# Patient Record
Sex: FEMALE | Race: WHITE | Employment: FULL TIME | URBAN - METROPOLITAN AREA
[De-identification: names, ages, dates, MRNs, and addresses within clinical notes are randomized per-mention and may not be internally consistent; named-entity substitution may affect disease eponyms.]

---

## 2021-10-28 ENCOUNTER — HOSPITAL ENCOUNTER (OUTPATIENT)
Dept: GENERAL RADIOLOGY | Age: 71
Discharge: HOME | End: 2021-10-28
Payer: MEDICARE

## 2021-10-28 DIAGNOSIS — M54.42 CHRONIC LEFT-SIDED LOW BACK PAIN WITH LEFT-SIDED SCIATICA: ICD-10-CM

## 2021-10-28 DIAGNOSIS — G89.29 CHRONIC LEFT-SIDED LOW BACK PAIN WITH LEFT-SIDED SCIATICA: ICD-10-CM

## 2021-10-28 PROCEDURE — 72100 X-RAY EXAM L-S SPINE 2/3 VWS: CPT

## 2021-11-24 ENCOUNTER — HOSPITAL ENCOUNTER (OUTPATIENT)
Dept: WOMENS IMAGING | Age: 71
Discharge: HOME | End: 2021-11-24
Payer: MEDICARE

## 2021-11-24 VITALS — HEIGHT: 64 IN | BODY MASS INDEX: 26.46 KG/M2 | WEIGHT: 155 LBS

## 2021-11-24 DIAGNOSIS — Z12.31 VISIT FOR SCREENING MAMMOGRAM: ICD-10-CM

## 2021-11-24 PROCEDURE — 77063 BREAST TOMOSYNTHESIS BI: CPT

## 2022-02-06 ENCOUNTER — HOSPITAL ENCOUNTER (EMERGENCY)
Age: 72
Discharge: HOME | End: 2022-02-06
Attending: EMERGENCY MEDICINE
Payer: MEDICARE

## 2022-02-06 VITALS
BODY MASS INDEX: 26.46 KG/M2 | RESPIRATION RATE: 19 BRPM | HEIGHT: 64 IN | HEART RATE: 74 BPM | WEIGHT: 155 LBS | OXYGEN SATURATION: 100 % | SYSTOLIC BLOOD PRESSURE: 167 MMHG | TEMPERATURE: 98 F | DIASTOLIC BLOOD PRESSURE: 77 MMHG

## 2022-02-06 DIAGNOSIS — R11.0 NAUSEA: ICD-10-CM

## 2022-02-06 DIAGNOSIS — R73.9 HYPERGLYCEMIA: Primary | ICD-10-CM

## 2022-02-06 LAB
A/G RATIO: 1.9 (ref 1.1–2.2)
ALBUMIN SERPL-MCNC: 4.3 G/DL (ref 3.4–5)
ALP BLD-CCNC: 105 U/L (ref 40–129)
ALT SERPL-CCNC: 14 U/L (ref 10–40)
ANION GAP SERPL CALCULATED.3IONS-SCNC: 13 MMOL/L (ref 3–16)
AST SERPL-CCNC: 13 U/L (ref 15–37)
BASE EXCESS VENOUS: 1.9 MMOL/L (ref -3–3)
BASOPHILS ABSOLUTE: 0 K/UL (ref 0–0.2)
BASOPHILS RELATIVE PERCENT: 0.6 %
BETA-HYDROXYBUTYRATE: 0.1 MMOL/L (ref 0–0.27)
BILIRUB SERPL-MCNC: 0.4 MG/DL (ref 0–1)
BUN BLDV-MCNC: 19 MG/DL (ref 7–20)
CALCIUM SERPL-MCNC: 9.6 MG/DL (ref 8.3–10.6)
CARBOXYHEMOGLOBIN: 0.9 % (ref 0–1.5)
CHLORIDE BLD-SCNC: 101 MMOL/L (ref 99–110)
CO2: 26 MMOL/L (ref 21–32)
CREAT SERPL-MCNC: 0.7 MG/DL (ref 0.6–1.2)
EKG ATRIAL RATE: 74 BPM
EKG DIAGNOSIS: NORMAL
EKG P AXIS: 70 DEGREES
EKG P-R INTERVAL: 122 MS
EKG Q-T INTERVAL: 394 MS
EKG QRS DURATION: 80 MS
EKG QTC CALCULATION (BAZETT): 437 MS
EKG R AXIS: 40 DEGREES
EKG T AXIS: 58 DEGREES
EKG VENTRICULAR RATE: 74 BPM
EOSINOPHILS ABSOLUTE: 0.1 K/UL (ref 0–0.6)
EOSINOPHILS RELATIVE PERCENT: 1.4 %
GFR AFRICAN AMERICAN: >60
GFR NON-AFRICAN AMERICAN: >60
GLUCOSE BLD-MCNC: 177 MG/DL (ref 70–99)
GLUCOSE BLD-MCNC: 194 MG/DL (ref 70–99)
HCO3 VENOUS: 27.4 MMOL/L (ref 23–29)
HCT VFR BLD CALC: 43.1 % (ref 36–48)
HEMOGLOBIN: 14.2 G/DL (ref 12–16)
LYMPHOCYTES ABSOLUTE: 1.2 K/UL (ref 1–5.1)
LYMPHOCYTES RELATIVE PERCENT: 14.7 %
MCH RBC QN AUTO: 29.9 PG (ref 26–34)
MCHC RBC AUTO-ENTMCNC: 33 G/DL (ref 31–36)
MCV RBC AUTO: 90.6 FL (ref 80–100)
METHEMOGLOBIN VENOUS: 0.4 %
MONOCYTES ABSOLUTE: 0.7 K/UL (ref 0–1.3)
MONOCYTES RELATIVE PERCENT: 8.2 %
NEUTROPHILS ABSOLUTE: 6.4 K/UL (ref 1.7–7.7)
NEUTROPHILS RELATIVE PERCENT: 75.1 %
O2 SAT, VEN: 68 %
O2 THERAPY: NORMAL
PCO2, VEN: 46 MMHG (ref 40–50)
PDW BLD-RTO: 13.3 % (ref 12.4–15.4)
PERFORMED ON: ABNORMAL
PH VENOUS: 7.39 (ref 7.35–7.45)
PLATELET # BLD: 255 K/UL (ref 135–450)
PMV BLD AUTO: 8.2 FL (ref 5–10.5)
PO2, VEN: 35.2 MMHG (ref 25–40)
POTASSIUM REFLEX MAGNESIUM: 4.3 MMOL/L (ref 3.5–5.1)
RBC # BLD: 4.76 M/UL (ref 4–5.2)
SODIUM BLD-SCNC: 140 MMOL/L (ref 136–145)
TCO2 CALC VENOUS: 29 MMOL/L
TOTAL PROTEIN: 6.6 G/DL (ref 6.4–8.2)
TROPONIN: <0.01 NG/ML
WBC # BLD: 8.5 K/UL (ref 4–11)

## 2022-02-06 PROCEDURE — 93005 ELECTROCARDIOGRAM TRACING: CPT | Performed by: EMERGENCY MEDICINE

## 2022-02-06 PROCEDURE — 85025 COMPLETE CBC W/AUTO DIFF WBC: CPT

## 2022-02-06 PROCEDURE — 96374 THER/PROPH/DIAG INJ IV PUSH: CPT

## 2022-02-06 PROCEDURE — 99283 EMERGENCY DEPT VISIT LOW MDM: CPT

## 2022-02-06 PROCEDURE — 2580000003 HC RX 258: Performed by: EMERGENCY MEDICINE

## 2022-02-06 PROCEDURE — 84484 ASSAY OF TROPONIN QUANT: CPT

## 2022-02-06 PROCEDURE — 93010 ELECTROCARDIOGRAM REPORT: CPT | Performed by: INTERNAL MEDICINE

## 2022-02-06 PROCEDURE — 82010 KETONE BODYS QUAN: CPT

## 2022-02-06 PROCEDURE — 6360000002 HC RX W HCPCS: Performed by: EMERGENCY MEDICINE

## 2022-02-06 PROCEDURE — 82803 BLOOD GASES ANY COMBINATION: CPT

## 2022-02-06 PROCEDURE — 80053 COMPREHEN METABOLIC PANEL: CPT

## 2022-02-06 PROCEDURE — 96361 HYDRATE IV INFUSION ADD-ON: CPT

## 2022-02-06 RX ORDER — 0.9 % SODIUM CHLORIDE 0.9 %
1000 INTRAVENOUS SOLUTION INTRAVENOUS ONCE
Status: COMPLETED | OUTPATIENT
Start: 2022-02-06 | End: 2022-02-06

## 2022-02-06 RX ORDER — ONDANSETRON 2 MG/ML
4 INJECTION INTRAMUSCULAR; INTRAVENOUS ONCE
Status: COMPLETED | OUTPATIENT
Start: 2022-02-06 | End: 2022-02-06

## 2022-02-06 RX ORDER — ONDANSETRON 4 MG/1
4 TABLET, ORALLY DISINTEGRATING ORAL EVERY 8 HOURS PRN
Qty: 14 TABLET | Refills: 0 | Status: SHIPPED | OUTPATIENT
Start: 2022-02-06 | End: 2022-09-29 | Stop reason: ALTCHOICE

## 2022-02-06 RX ADMIN — ONDANSETRON 4 MG: 2 INJECTION INTRAMUSCULAR; INTRAVENOUS at 00:50

## 2022-02-06 RX ADMIN — SODIUM CHLORIDE 1000 ML: 9 INJECTION, SOLUTION INTRAVENOUS at 00:51

## 2022-02-06 NOTE — ED PROVIDER NOTES
[address] [facility]  ED PROVIDER NOTE    Patient Identification  Pt Name: Karlene Vivar  MRN: [id number]  [place] 1950  Date of evaluation: 2/6/2022  Provider: Catherine Polanco MD  PCP: Arun Mahajan MD    Chief Complaint  Other (Hypoglycemia)      HPI  History provided by patient   This is a 73 y.o. female who presents to the ED for chief complaint of labile blood sugars. States that her blood sugar went from 200-100 very quickly. She has been feeling nauseated over the past 24 hours. She thinks that this may be due to a steroid injection she received for her chronic lower back pain however the steroid injection occurred on the 18th. Denies any pain. No chest pain, shortness of breath, abdominal pain, headache. No diarrhea. Ambulating without issue. No recent changes in her medications. ROS  12 systems reviewed, pertinent positives/negatives per HPI otherwise noted to be negative. I have reviewed the following nursing documentation:  Allergies: Patient has no known allergies. Past medical history:   Past Medical History:   Diagnosis Date    Bronchitis     Diabetes mellitus ([address]     Hypertension      Past surgical history:   Past Surgical History:   Procedure Laterality Date    HYSTERECTOMY      KIDNEY STONE SURGERY         Home medications:   Discharge Medication List as of 2/6/2022  2:19 AM      CONTINUE these medications which have NOT CHANGED    Details   sitagliptan (JANUVIA) 50 MG tablet Take  by mouth daily. Indications: unsure of dosageHistorical Med      atorvastatin (LIPITOR) 10 MG tablet Take  by mouth daily. Indications: unsure of dosageHistorical Med      lisinopril (PRINIVIL;ZESTRIL) 10 MG tablet Take  by mouth daily. Indications: unsure of dosageHistorical Med             Social history:  reports that she has never smoked. She has never used smokeless tobacco. She reports that she does not drink alcohol and does not use drugs.     Family history:  History reviewed. No pertinent family history. Exam  ED Triage Vitals [02/06/22 0014]   BP Temp Temp Source Pulse Resp SpO2 Height Weight   (!) 167/77 98 °F (36.7 °C) Oral -- 19 100 % 5' 4\" (1.626 m) 155 lb (70.3 kg)     Nursing note and vitals reviewed. Constitutional: In no acute distress  HENT:      Head: Normocephalic      Ears: External ears normal.      Nose: Nose normal.     Mouth: Membrane mucosa dry  Eyes: No discharge. Neck: Supple. Trachea midline. Cardiovascular: Regular rate. Warm extremities  Pulmonary/Chest: Effort normal. No respiratory distress. Abdominal: Soft. No distension. Nontender  : Deferred  Rectal: Deferred   Musculoskeletal: Moves all extremities. No gross deformity. Neurological: Alert and oriented. Face symmetric. Speech is clear. Skin: Warm and dry. Psychiatric: Normal mood and affect. Behavior is normal.    Procedures      EKG  The Ekg interpreted by me in the absence of a cardiologist shows. Normal sinus rhythm. No specific ST changes appreciated.   HR 74  No prior EKG for comparison      Radiology  No orders to display       Labs  Results for orders placed or performed during the hospital encounter of 02/06/22   CBC Auto Differential   Result Value Ref Range    WBC 8.5 4.0 - 11.0 K/uL    RBC 4.76 4.00 - 5.20 M/uL    Hemoglobin 14.2 12.0 - 16.0 g/dL    Hematocrit 43.1 36.0 - 48.0 %    MCV 90.6 80.0 - 100.0 fL    MCH 29.9 26.0 - 34.0 pg    MCHC 33.0 31.0 - 36.0 g/dL    RDW 13.3 12.4 - 15.4 %    Platelets 573 222 - 377 K/uL    MPV 8.2 5.0 - 10.5 fL    Neutrophils % 75.1 %    Lymphocytes % 14.7 %    Monocytes % 8.2 %    Eosinophils % 1.4 %    Basophils % 0.6 %    Neutrophils Absolute 6.4 1.7 - 7.7 K/uL    Lymphocytes Absolute 1.2 1.0 - 5.1 K/uL    Monocytes Absolute 0.7 0.0 - 1.3 K/uL    Eosinophils Absolute 0.1 0.0 - 0.6 K/uL    Basophils Absolute 0.0 0.0 - 0.2 K/uL   Comprehensive Metabolic Panel w/ Reflex to MG   Result Value Ref Range    Sodium 140 136 - 145 mmol/L Potassium reflex Magnesium 4.3 3.5 - 5.1 mmol/L    Chloride 101 99 - 110 mmol/L    CO2 26 21 - 32 mmol/L    Anion Gap 13 3 - 16    Glucose 194 (H) 70 - 99 mg/dL    BUN 19 7 - 20 mg/dL    CREATININE 0.7 0.6 - 1.2 mg/dL    GFR Non-African American >60 >60    GFR African American >60 >60    Calcium 9.6 8.3 - 10.6 mg/dL    Total Protein 6.6 6.4 - 8.2 g/dL    Albumin 4.3 3.4 - 5.0 g/dL    Albumin/Globulin Ratio 1.9 1.1 - 2.2    Total Bilirubin 0.4 0.0 - 1.0 mg/dL    Alkaline Phosphatase 105 40 - 129 U/L    ALT 14 10 - 40 U/L    AST 13 (L) 15 - 37 U/L   Blood Gas, Venous   Result Value Ref Range    pH, Joseph 7.393 7.350 - 7.450    pCO2, Joseph 46.0 40.0 - 50.0 mmHg    pO2, Joseph 35.2 25.0 - 40.0 mmHg    HCO3, Venous 27.4 23.0 - 29.0 mmol/L    Base Excess, Joseph 1.9 -3.0 - 3.0 mmol/L    O2 Sat, Joseph 68 Not Established %    Carboxyhemoglobin 0.9 0.0 - 1.5 %    MetHgb, Joseph 0.4 <1.5 %    TC02 (Calc), Joseph 29 Not Established mmol/L    O2 Therapy Unknown    Troponin   Result Value Ref Range    Troponin <0.01 <0.01 ng/mL   POCT Glucose   Result Value Ref Range    POC Glucose 177 (H) 70 - 99 mg/dl    Performed on ACCU-CHEK    EKG 12 Lead   Result Value Ref Range    Ventricular Rate 74 BPM    Atrial Rate 74 BPM    P-R Interval 122 ms    QRS Duration 80 ms    Q-T Interval 394 ms    QTc Calculation (Bazett) 437 ms    P Axis 70 degrees    R Axis 40 degrees    T Axis 58 degrees    Diagnosis       Normal sinus rhythmNormal ECGWhen compared with ECG of 25-FEB-2010 07:40,Nonspecific T wave abnormality no longer evident in Anterior leads       Screenings           MDM and ED Course  This is a 73 y.o. female who presents to the ED for chief complaint of labile blood sugars and nausea. She is controlled with sitagliptin. Will check for euglycemic DKA. Appears dehydrated. Will give IV fluids. No abdominal pain or tenderness to indicate cholecystitis, appendicitis. No chest discomfort or shortness of breath to indicate ACS, pulmonary embolism.   No headache to indicate meningitis, subarachnoid hemorrhage. Unclear etiology for her nausea. Vitals unremarkable except for hypertension. Labs unremarkable except for hyperglycemia. Patient asymptomatic at this time after Zofran administration. Wishes to go home. All questions answered and return precautions given. [unfilled]    Final Impression  1. Hyperglycemia    2. Nausea        Blood pressure (!) 167/77, pulse 74, temperature 98 °F (36.7 °C), temperature source Oral, resp. rate 19, height 5' 4\" (1.626 m), weight 155 lb (70.3 kg), SpO2 100 %. Disposition:  DISPOSITION Decision To Discharge 02/06/2022 01:41:40 AM      Patient Referrals:  Rob Marx MD  [address] [place] [address]  [phone]    In 1 day        Discharge Medications:  Discharge Medication List as of 2/6/2022  2:19 AM      START taking these medications    Details   ondansetron (ZOFRAN ODT) 4 MG disintegrating tablet Take 1 tablet by mouth every 8 hours as needed for Nausea or Vomiting, Disp-14 tablet, R-0Normal             Discontinued Medications:  Discharge Medication List as of 2/6/2022  2:19 AM      STOP taking these medications       pioglitazone (ACTOS) 45 MG tablet Comments:   Reason for Stopping:         levofloxacin (LEVAQUIN) 500 MG tablet Comments:   Reason for Stopping:         predniSONE (DELTASONE) 10 MG tablet Comments:   Reason for Stopping:         Hydrocod Polst-Chlorphen Polst (TUSSICAPS PO) Comments:   Reason for Stopping:         albuterol (PROVENTIL HFA;VENTOLIN HFA) 108 (90 BASE) MCG/ACT inhaler Comments:   Reason for Stopping: This chart was generated using the [address] [place] [address] [employer] system. I created this record but it may contain dictation errors given the limitations of this technology.         Nelli Howe MD  02/06/22 5698

## 2022-06-14 ENCOUNTER — HOSPITAL ENCOUNTER (OUTPATIENT)
Dept: WOMENS IMAGING | Age: 72
Discharge: HOME | End: 2022-06-14
Payer: MEDICARE

## 2022-06-14 DIAGNOSIS — D48.61 NEOPLASM OF UNCERTAIN BEHAVIOR OF RIGHT BREAST: ICD-10-CM

## 2022-06-14 PROCEDURE — 76642 ULTRASOUND BREAST LIMITED: CPT

## 2022-06-14 PROCEDURE — G0279 TOMOSYNTHESIS, MAMMO: HCPCS

## 2022-08-01 SDOH — HEALTH STABILITY: PHYSICAL HEALTH: ON AVERAGE, HOW MANY DAYS PER WEEK DO YOU ENGAGE IN MODERATE TO STRENUOUS EXERCISE (LIKE A BRISK WALK)?: 0 DAYS

## 2022-08-03 ENCOUNTER — OFFICE VISIT (OUTPATIENT)
Dept: ORTHOPEDIC SURGERY | Age: 72
End: 2022-08-03
Payer: MEDICARE

## 2022-08-03 VITALS — WEIGHT: 155 LBS | HEIGHT: 64 IN | BODY MASS INDEX: 26.46 KG/M2

## 2022-08-03 DIAGNOSIS — M54.50 LOW BACK PAIN POTENTIALLY ASSOCIATED WITH RADICULOPATHY: Primary | ICD-10-CM

## 2022-08-03 PROCEDURE — 1123F ACP DISCUSS/DSCN MKR DOCD: CPT | Performed by: ORTHOPAEDIC SURGERY

## 2022-08-03 PROCEDURE — 99204 OFFICE O/P NEW MOD 45 MIN: CPT | Performed by: ORTHOPAEDIC SURGERY

## 2022-08-03 NOTE — PROGRESS NOTES
New Patient: LUMBAR SPINE    Referring Provider:  No ref. provider found    CHIEF COMPLAINT:    Chief Complaint   Patient presents with    Back Problem     Back, buttock pain, some leg weakness       HISTORY OF PRESENT ILLNESS:    Ms. Stacy White  is a pleasant 81 y.o. male currently referred by Dr. Adriana Alfredo for the evaluation of low back and left leg pain. Her pain began insidiously about 1.5 years ago. It has increased since that time. She rates her left buttock and S1 distribution pain 9/10. Her pain is substantially worse with standing for even short periods of time or walking short distances. She notes weakness of her left leg. She denies numbness and tingling of her lower extremities, saddle anesthesia and bowel or bladder abnormality. Current/Past Treatment:   Physical Therapy: Exercises did not help  Chiropractic: No  Injection: CINDY x2 with temporary relief  Medications: None    Past Medical History:   Past Medical History:   Diagnosis Date    Bronchitis     Diabetes mellitus ([facility] [address]     Hypertension       Past Surgical History:     Past Surgical History:   Procedure Laterality Date    HYSTERECTOMY (CERVIX STATUS UNKNOWN)      KIDNEY STONE SURGERY       Current Medications:     Current Outpatient Medications:     ondansetron (ZOFRAN ODT) 4 MG disintegrating tablet, Take 1 tablet by mouth every 8 hours as needed for Nausea or Vomiting, Disp: 14 tablet, Rfl: 0    sitagliptan (JANUVIA) 50 MG tablet, Take  by mouth daily. Indications: unsure of dosage, Disp: , Rfl:     atorvastatin (LIPITOR) 10 MG tablet, Take  by mouth daily. Indications: unsure of dosage, Disp: , Rfl:     lisinopril (PRINIVIL;ZESTRIL) 10 MG tablet, Take  by mouth daily. Indications: unsure of dosage, Disp: , Rfl:   Allergies:  Patient has no known allergies. Social History:    reports that she has never smoked.  She has never used smokeless tobacco. She reports that she does not drink alcohol and does not use drugs. Family History:   History reviewed. No pertinent family history. REVIEW OF SYSTEMS: Full ROS noted & scanned   CONSTITUTIONAL: Denies unexplained weight loss, fevers, chills or fatigue  NEUROLOGICAL: Denies unsteady gait or progressive weakness  MUSCULOSKELETAL: Denies joint swelling or redness  PSYCHOLOGICAL: Denies anxiety, depression   SKIN: Denies skin changes, delayed healing, rash, itching   HEMATOLOGIC: Denies easy bleeding or bruising  ENDOCRINE: Denies excessive thirst, urination, heat/cold  RESPIRATORY: Denies current dyspnea, cough  GI: Denies nausea, vomiting, diarrhea   : Denies bowel or bladder issues      PHYSICAL EXAM:    Vitals: Height 5' 4\" (1.626 m), weight 155 lb (70.3 kg). GENERAL EXAM:  General Apparence: Patient is adequately groomed with no evidence of malnutrition. Orientation: The patient is oriented to time, place and person. Mood & Affect:The patient's mood and affect are appropriate. Vascular: Examination reveals no swelling tenderness in upper or lower extremities. Good capillary refill. Lymphatic: The lymphatic examination bilaterally reveals all areas to be without enlargement or induration  Sensation: Sensation is intact without deficit  Coordination/Balance: Good coordination     LUMBAR/SACRAL EXAMINATION:  Inspection: Local inspection shows no step-off or bruising. Lumbar alignment is normal.  Sagittal and Coronal balance is neutral.      Palpation:   No evidence of tenderness at the midline. No tenderness bilaterally at the paraspinal or trochanters. There is no step-off or paraspinal spasm. Range of Motion: Lumbar flexion, extension and rotation are mildly limited due to pain. Strength:   Strength testing is 5/5 in all muscle groups tested. Special Tests:   Straight leg raise and crossed SLR negative. Leg length and pelvis level. Skin: There are no rashes, ulcerations or lesions.   Reflexes: Reflexes are symmetrically 2+ at the patellar and ankle tendons. Clonus absent bilaterally at the feet. Gait & station: normal, patient ambulates without assistance    Additional Examinations:   RIGHT LOWER EXTREMITY: Inspection/examination of the right lower extremity does not show any tenderness, deformity or injury. Range of motion is unremarkable. There is no gross instability. There are no rashes, ulcerations or lesions. Strength and tone are normal.    LEFT LOWER EXTREMITY:  Inspection/examination of the left lower extremity does not show any tenderness, deformity or injury. Range of motion is unremarkable. There is no gross instability. There are no rashes, ulcerations or lesions. Strength and tone are normal.    Diagnostic Testing:    I reviewed MRI images of her lumbar spine from 12/18/2021 in the office today. Those show severe central stenosis L4-L5    Impression:   Severe central stenosis L4-L5 with neurogenic claudication    Plan:    Discussed treatment options including observation, physical therapy, epidural injection, and additional imaging. She would like to proceed with therapy and repeat lumbar MRI to be certain she is still a candidate for microlumbar decompression. We discussed the risks, benefits, and alternatives to surgery including the risks of nerve or vessel injury, paralysis, spinal blood clot, spinal fluid leak, death, infection, need for additional spinal surgery,  failure of surgery to alleviate her symptoms and worse symptoms following surgery. She understands these risks and wishes to proceed with surgery if her persist after physical therapy and her MRI confirms persistent severe stenosis. Her questions were answered.

## 2022-08-12 ENCOUNTER — HOSPITAL ENCOUNTER (OUTPATIENT)
Dept: PHYSICAL THERAPY | Age: 72
Setting detail: THERAPIES SERIES
Discharge: HOME | End: 2022-08-12
Payer: MEDICARE

## 2022-08-12 ENCOUNTER — TELEPHONE (OUTPATIENT)
Dept: ORTHOPEDIC SURGERY | Age: 72
End: 2022-08-12

## 2022-08-12 PROCEDURE — 97110 THERAPEUTIC EXERCISES: CPT

## 2022-08-12 PROCEDURE — 97162 PT EVAL MOD COMPLEX 30 MIN: CPT

## 2022-08-12 NOTE — FLOWSHEET NOTE
[address] [facility], [facility] [address]   Phone: [phone]  Fax [phone]    Physical Therapy Daily Treatment Note    Date:  2022    Patient Name:  Hayley Burns    :  1950  MRN: [id number]  Restrictions/Precautions:    Medical/Treatment Diagnosis Information:  Diagnosis: M54.50 (ICD-10-CM) - Low back pain potentially associated with radiculopathy  Insurance/Certification information:  PT Insurance Information: [employer] - $77 copay, no precert, BMN  Physician Information:   Manuelito Patten MD  Plan of care signed (Y/N):  N  Visit# / total visits:  1/10     G-Code (if applicable):          Physical FS Primary Measure 35  Predicted Points of Change  16  Predicted # of visits   12  Predicted Discharge FS Score 51      Medicare Cap (if applicable):  N/a = total amount used, updated 2022    Time in:   7:05      Timed Treatment: 10    Total Treatment Time:  40  Time out: 7:45  ________________________________________________________________________________________    Pain Level:    /10  SUBJECTIVE:  See initial eval    OBJECTIVE:     Exercise/Equipment Resistance/Repetitions Other comments          [facility]   5x10\" B HEP   LTR Attempted but held due to increased pain    PPT 10x5\" HEP   Seated trunk flexion 10x HEP          Glute set NV    clamshell NV                                                                      TG with MHP NV             Other Therapeutic Activities:  Education regarding POC including demonstration with models of anatomy and physiology in order to maximize benefits of treatment    Manual Treatments:         Modalities:  consider IFC NV    Test/Measurements:  See initial eval       ASSESSMENT:     See initial eval    Treatment/Activity Tolerance:   []Patient tolerated treatment well [] Patient limited by fatique  [x]Patient limited by pain [] Patient limited by other medical complications  [] Other:     Goals:    Short term goal 1: Pt will be indep in HEP  Short term goal 2: Pt will decrease pain 25%  Short term goal 3: Pt will increase B hip strength to 4/5  Short term goal 4: Pt will demonstrate WFL lumbar and B hip AROM  Short term goal 5: Pt will ambulate without deviation     Plan: [] Continue per plan of care [] Alter current plan (see comments)   [x] Plan of care initiated [] Hold pending MD visit [] Discharge      Plan for Next Session:  Correction of biomechanical dysfunctions as they present on visit. Restore proper motor firing pattern and functional muscle activation as presented on visit. Progress as tolerates.     Re-Certification Due Date:         Signature:  Fabi Ngo PT

## 2022-08-12 NOTE — TELEPHONE ENCOUNTER
----- Message from Maribel Rutherford MD sent at 8/9/2022  8:13 AM EDT -----  Lumbar MRI  Persistent severe stenosis  The surgery we discussed should help

## 2022-08-12 NOTE — PROGRESS NOTES
Yareli  [facility]. [facility], [facility], [address], [address]  Phone: [phone]  Fax [phone]    Dear Referring Practitioner: Dr. Roro Ca,     We had the pleasure of evaluating the following patient for physical therapy services at [facility]. A summary of our findings can be found in the initial assessment below. This includes our plan of care. If you have any questions or concerns regarding these findings, please do not hesitate to contact me at the office phone number. Thank you for the referral.         Physician Signature:_______________________________Date:__________________  By signing above (or electronic signature), therapists plan is approved by physician        LOWER EXTREMITY PHYSICAL THERAPY EVALUATION      Evaluation Date: 8/12/2022    Patient Name: Qing Melvin   YOB: 1950    Medical Diagnosis:  Low back pain, unspecified [M54.50]  Treatment Diagnosis:  back pain  Onset Date:  April 2021    Referral Date: 8/3/2022   Referring Provider: Angelique De Leon MD   Insurance Provider: Armand Soares - $40 copay, BMN, no precert  Restrictions/Precautions:    pt not agreeable to thrust techniques    SUBJECTIVE FINDINGS    History of Present Illness:  Pt presents with c/o pain in her L buttock - woke up about 16 months ago and felt that there was a knot in her buttocks on the L side. It has progressively worsened since then but was in the process of a move and it took awhile to get established with doctors [place] in [place]. She received a spinal epidural in January and February, second one lasting 3 months and when it came back, it came back in a fury. Feels like a hot poker as well as putting her hand in an electrical socket. Has gotten to the point where the pain is so severe that she cannot do anything, goes to work and comes home to her recliner. Cannot sleep.  Pain is mild when sitting, but upon standing, her pain shoots through her entire body. Cannot lean backwards, like in the shower to rinse off her hair. Medical History: gall bladder surgery, depression, DMII, partial hysterectomy   Current Functional Limitations: standing from a sitting position, walking, sleeping (25-50% of needed sleep), lying down   PLOF: walking 3 miles a day and had lost 40# prior to onset of back pain. C-SSRS Triggered by Intake questionnaire (Past 2 wk assessment):   [] No, Questionnaire did not trigger screening. [x] Yes, Patient intake triggered further evaluation      [x] C-SSRS Screening completed  [] PCP notified via Plan of Care  [] Emergency services notified    Red Flags:  night pain with inability to fall back asleep    Pain       Patient describes pain to be burning, sharp, shooting  Patient reports 6/10 pain at present and  10/10 pain at its worst.  Worsened by standing, lying, sleeping, moving  Improved by sitting   Pt. reports pain with coughing, sneezing and laughing:   [x]Yes   []No   []NA   Pt. reports bowel and bladder changes (incontinence, retention):   []Yes   [x]No   []NA   Pt. reports saddle paresthesia? []Yes   [x]No   []NA   Pt. reports that the knee/hip/ankle gives out, locks, pops, grinds     [x]Yes []   No    Pt's sleep is affected? [x]   Yes []   No  []   N/A      OBJECTIVE FINDINGS    Imaging Results: MRI August 2022  CONCLUSION:   1. L4-5 high-grade spinal stenosis. High-grade bilateral lateral recess stenosis. 4mm    anterolisthesis; moderate central and lateral, asymmetric to right, disc protrusion deforms    ventral and right ventral dural sac, impinges upon right L5 nerve root. Posterior element    hypertrophy. Left capsulosynovial proliferation. Similar to 12/18/2021 MRI. 2. L5-S1 shallow central disc protrusion deforms ventral dural sac; contacts S1 nerve roots. Moderate spinal stenosis. Posterior element hypertrophy. Similar to 12/18/2021 MRI.    3. L3-4 marked disc height reduction and trace posterior spondylotic change. Mild right    lateral recess narrowing. Tiny right capsulosynovial cyst.  Similar to 12/18/2021 MRI. 4. L2-3 borderline small spinal canal.  Shallow disc bulge. Similar to 12/18/2021 MRI. 5. L4 vertebral body signal altered sites, attributed to benign nonaggressive lesions, of    doubtful clinical significance. Favor hemangiomas and fatless hemangiomas. No further imaging    is warranted for monitoring L4 vertebral body. Palpation/Tenderness/Visual Inspection       Extreme tenderness to palpation       Gait/Steps/Balance    []   [facility] [x]    Dysfunction Noted. Comment: unable to straighten L LE into neutral hip extension due to pain, leading to hip flexion, knee flexion, and toe walking on L LE. Evidence of L knee buckling as pt ambulates.       []  All balance WFL unless otherwise noted below:  Single limb stance: unable due to severe pain - no core activation to aid and stabilize trunk  Squat: unable due to severe pain    Lumbar Range of Motion/Strength Testing      [x] All WFL except as marked below  ROM (*denotes pain) AROM PROM COMMENTS   Flexion 25%*     Extension Unable to achieve upright*     Sidebending Left 25%*     Sidebending Right 25%*     Rotation Left  0%*  [x]   Seated  [x]   Standing       Rotation Right 0%*   [x]   Seated  [x]   Standing         Pelvis/Sacral Assessment  Special Test Findings Comments   Standing   Kael' Sign []Neg   []Pos R   [x]Pos L   []NT    Iliac Crest []Level  []High R   []High L    Difficult to assess due to unable to bear even weight through her LE   ASIS []Level  []High R   []High L     PSIS []Level  []High R   []High L     Forward Flexion [x]Neg   []Pos R   []Pos L   []NT    Sacral Sulci [x]Even []Prominent R   []Prominent L     Supine   Leg Length [x]Level  []Long R   []Long L  []Sx on R [x]Sx on L     ASIS [x]Level  []High R   []High L     PSIS []Level  []High R   []High L Long Sit Test []Neg   []Pos R   []Pos L   [x]NT        Other Special Tests Lumbar Spine and Hip  Special Test Findings   Standing:    Lumbar reposition []Neg   [x]Pos   Seated:    Slump Test/Dural Tension []Neg   []Pos R   [x]Pos L   []NT       Supine:    90/90 test  []Neg   []Pos R   [x]Pos L   []NT   Gaenslen's test []Neg   []Pos R   []Pos L   [x]NT   Straight Leg Raise []Neg   []Pos R   [x]Pos L   []NT   Lumbar Distraction  [x]Relief noted   []No relief noted  [x]Rebound pain   []NT   Hip scour []Neg   []Pos R   []Pos L   [x]NT   Piper's test []Neg   []Pos R   []Pos L   [x]NT   Efren's test []Neg   []Pos R   []Pos L   [x]NT   Oscillation []Neg   []Pos R   []Pos L   [x]NT   Femoral nerve tension test []Neg   []Pos R   []Pos L   [x]NT     Sensation/Motor Function   [x] All dermatomes WNL except as marked below   [x] All  myotomes WNL except as marked below      Dermatome Left Right   Anterior groin, 2-3 inches below ASIS (L1-L2)     Middle third anterior thigh (L3)     Patella and med malleolus (L4)     Fibular head and dorsum of foot (L5)     Lateral side and plantar surface of foot (S1)     Medial aspect of posterior thigh (S2)     Perianal area (S3,4)            Range of Motion/Strength Testing     [x] All ROM and strength WNL except as marked below   * Denotes limitation by pain    Range Tested AROM PROM MMT/Resisted    Left Right Left Right Left Right   Hip Flexion     *3- *3-   Hip Extension Lacking 10 deg    *3- *3-   Hip Abduction 10        Hip Adduction         Hip IR 10    *3- *3-   Hip ER 10    *3- *3-   Knee Flexion     *3+ *3+   Knee Extension     *3+ *3+   Ankle Dorsiflex     4- 4-   Ankle Plantarflex         Ankle Inversion         Ankle Eversion           Flexibility     [x] All flexibility WNL except as marked below  Muscle Left Right   Hamstrings (90/90) []  WNL  [] Tight  [x] NT []  WNL  [] Tight  [x] NT   Gastroc []  WNL  [] Tight  [x] NT []  WNL  [] Tight  [x] NT   TFL/ITB (Phuongs) []  WNL [] Tight  [x] NT []  WNL  [] Tight  [x] NT   Iliopsoas Verita Clos) []  WNL  [] Tight  [x] NT []  WNL  [] Tight  [x] NT   Piriformis []  WNL  [] Tight  [x] NT []  WNL  [] Tight  [x] NT     Reflexes/Trunk Strength    [x] All WNL except as marked below     Reflex Left Right Strength Strength   Quadriceps (L3,4)   Transv Abdominis    Achilles (S1,2)       Ankle clonus       Babinski           ASSESSMENT  Pt is a 82 y/o presenting to physical therapy with c/o severe radicular symptoms in L LE as well as moderate symptoms in R LE likely secondary to compensation. Thorough evaluation and examination, identified findings consistent with impaired core stabilization, decreased ROM, decreased strength, and significant reduction in all ADLs and IADLs. Patient's severe pain has led to significant depression and reduction in quality of life. Her pain levels prohibited full examination this date due to apprehension and rebound pain with minimal movement. PT recommending trial of flexion based program as that was the only movement that didn't worsen patient's pain, as well as anti-inflammatory agents such as IFC estim. Should patient's functional status not have significant improvement with initiation of exercise and gentle manual therapy, pt likely would benefit from continued conversation for further non-conservative intervention. Body Structures, Functions, Activity Limitations Requiring Skilled Therapeutic Intervention: Decreased functional mobility ,Decreased ADL status,Decreased ROM,Decreased body mechanics,Decreased strength,Decreased balance,Increased pain     Statement of Medical Necessity: Physical Therapy is both indicated and medically necessary as outlined in the POC to increase the likelihood of meeting the functionally related goals stated below. Eval Complexity:    Decision Making:  Mod Complexity    PLAN OF CARE    Frequency: 2x/wk for 5 weeks  Current Treatment Recommendations: Therapeutic exercise, therapeutic activity, manual therapy, gait training, neuromuscular re-education    FUNCTIONAL OUTCOME MEASURE  Physical FS Primary Measure 35  Predicted Points of Change  16  Predicted # of visits   12  Predicted Discharge FS Score 51    GOALS  Short term goal 1: Pt will be indep in HEP  Short term goal 2: Pt will decrease pain 25%  Short term goal 3: Pt will increase B hip strength to 4/5  Short term goal 4: Pt will demonstrate [facility] lumbar and B hip AROM  Short term goal 5: Pt will ambulate without deviation    Thank you for the referral of this patient.      Time In:  7:05  Time Out: 7:45  Timed Code Treatment Minutes:  10  minutes            Total Treatment Time:  40 minutes      Julianne Reyes PT DPT  license #[id number]

## 2022-08-16 ENCOUNTER — TELEPHONE (OUTPATIENT)
Dept: ORTHOPEDIC SURGERY | Age: 72
End: 2022-08-16

## 2022-08-16 NOTE — TELEPHONE ENCOUNTER
General Question     Subject: lumbar mri result  Patient and /or Facility Request: Mariel Baig Number: [phone]    Patient is returning call to [facility] regarding results of her mri. Please return call to the above number.

## 2022-08-18 NOTE — PROGRESS NOTES
Mary Brush Patholt    Age 81 y.o.    female    1950    MRN [id number]    9/16/2022  Arrival Time_____________  OR Time____________141 Min     Procedure(s): MICROLUMBAR LAMINECTOMY LUMBAR 4 - LUMBAR 5                      General   Surgeon(s):  Jimmie Lau, MD      DAY ADMIT ___  SDS/OP ___  OUTPT IN BED ___        Phone [phone] (home) [phone] (work)    PCP _____________________ Phone_________________ Lorrine  ( ) Epic CE ( ) Appt ________    ADDITIONAL INFO __________________________________ Cardio/Consult _____________    NOTES _____________________________________________________________________    ____________________________________________________________________________    PAT APPT DATE:________ TIME: ________  FAXED QAD: _______  (__) H&P w/ Hospitalist  __________________________________________________________________________  Preop Nurse phone screen complete: _____________  (__) CBC     (__) W/ DIFF ___________     (__) Hgb A1C    ___________  (__) CHEST X RAY   __________  (__) LIPID PROFILE  ___________  (__) EKG   __________  (__) PT/PTT   ___________  (__) PFT's   __________  (__) BMP   ___________  (__) CAROTIDS  __________  (__) CMP   ___________  (__) VEIN MAPPING  __________  (__) U/A   ___________  (__) HISTORY & PHYSICAL __________  (__) URINE C & S  ___________  (__) CARDIAC CLEARANCE __________  (__) U/A W/ FLEX  ___________  (__) PULM.  CLEARANCE __________  (__) SERUM PREGNANCY ___________  (__) Check Epic DOS orders __________  (__) TYPE & SCREEN __________repeat ( ) (__)  __________________ __________  (__) ALBUMIN Alejandro Lion ___________  (__)  __________________ __________  (__) TRANSFERRIN  ___________  (__)  __________________ __________  (__) LIVER PROFILE  ___________  (__)  __________________ __________  (__) MRSA NASAL SWAB ___________  (__) URINE PREG DOS __________  (__) SED RATE  ___________  (__) BLOOD SUGAR DOS __________  (__) C-REACTIVE PROTEIN ___________    (__) VITAMIN D HYDROXY ___________  (__) BLOOD THINNERS __________    (__) ACE/ ARBS: _____________________     (__) BETABLOCKERS __________________

## 2022-08-22 ENCOUNTER — HOSPITAL ENCOUNTER (OUTPATIENT)
Dept: PHYSICAL THERAPY | Age: 72
Setting detail: THERAPIES SERIES
Discharge: HOME | End: 2022-08-22
Payer: MEDICARE

## 2022-08-22 PROCEDURE — G0283 ELEC STIM OTHER THAN WOUND: HCPCS

## 2022-08-22 PROCEDURE — 97140 MANUAL THERAPY 1/> REGIONS: CPT

## 2022-08-22 NOTE — FLOWSHEET NOTE
Yareli  81. and Therapy, [facility], [facility], [address] [place] [address] Dr  Phone: [phone]  Fax [phone]    Physical Therapy Daily Treatment Note    Date:  2022    Patient Name:  Gerhardt Amato    :  1950  MRN: [id number]  Restrictions/Precautions:    Medical/Treatment Diagnosis Information:  Diagnosis: M54.50 (ICD-10-CM) - Low back pain potentially associated with radiculopathy  Insurance/Certification information:  PT Insurance Information: A[employer]na Medicare - $33 copay, no precert, BMN  Physician Information:   Sandy Gallego MD  Plan of care signed (Y/N):  N  Visit# / total visits:  2/10     G-Code (if applicable):          Physical FS Primary Measure 35  Predicted Points of Change  16  Predicted # of visits   12  Predicted Discharge FS Score 51      Medicare Cap (if applicable):  N/a = total amount used, updated 2022    Time in:   7:00      Timed Treatment: 25   Total Treatment Time:  45  Time out: 7:45  ________________________________________________________________________________________    Pain Level:    /10  SUBJECTIVE:  Pt reports that the only exercise that helped was the leaning forward supported with arms prior to standing.      OBJECTIVE:     Exercise/Equipment Resistance/Repetitions Other comments          SKTC   5x10\" B HEP   LTR Attempted but held due to increased pain    PPT 10x5\" HEP   Seated trunk flexion 10x HEP          Glute set NV    clamshell NV                                                                      TG with MHP NV             Other Therapeutic Activities:  Education regarding POC including demonstration with models of anatomy and physiology in order to maximize benefits of treatment    Manual Treatments:       STM to L QL and lumbar paraspinals  MFR to L gluteals  Neural tracing  Sciatic nerve tensioners and sliders    Total manual 25 minutes     Modalities:  IFC to patient tolerance with MHP x20 minutes     Test/Measurements:  See initial eval       ASSESSMENT:    Pt reported relief of pain during estim and heat. Tolerated manual therapy without significant adverse effects. Due to severe nerve pain from compression, pt tolerance to exercise is extremely minimal. Recommend continued symptom relief and progress slowly as pt tolerates, unless further non-conservative intervention becomes available to patient. Treatment/Activity Tolerance:   []Patient tolerated treatment well [] Patient limited by fatique  [x]Patient limited by pain [] Patient limited by other medical complications  [] Other:     Goals:    Short term goal 1: Pt will be indep in HEP  Short term goal 2: Pt will decrease pain 25%  Short term goal 3: Pt will increase B hip strength to 4/5  Short term goal 4: Pt will demonstrate WFL lumbar and B hip AROM  Short term goal 5: Pt will ambulate without deviation     Plan: [x] Continue per plan of care [] Alter current plan (see comments)   [] Plan of care initiated [] Hold pending MD visit [] Discharge      Plan for Next Session:  Correction of biomechanical dysfunctions as they present on visit. Restore proper motor firing pattern and functional muscle activation as presented on visit. Progress as tolerates.     Re-Certification Due Date:         Signature:  Taylor Finn, PT

## 2022-08-26 ENCOUNTER — TELEPHONE (OUTPATIENT)
Dept: ORTHOPEDIC SURGERY | Age: 72
End: 2022-08-26

## 2022-08-26 ENCOUNTER — HOSPITAL ENCOUNTER (OUTPATIENT)
Dept: PHYSICAL THERAPY | Age: 72
Setting detail: THERAPIES SERIES
Discharge: HOME | End: 2022-08-26
Payer: MEDICARE

## 2022-08-26 PROCEDURE — G0283 ELEC STIM OTHER THAN WOUND: HCPCS

## 2022-08-26 PROCEDURE — 97140 MANUAL THERAPY 1/> REGIONS: CPT

## 2022-08-26 NOTE — FLOWSHEET NOTE
Yareli  [facility]. [facility], [address], [address] [place] [address] Dr  Phone: [phone]  Fax [phone]    Physical Therapy Daily Treatment Note    Date:  2022    Patient Name:  No Moreno    :  1950  MRN: [id number]  Restrictions/Precautions:    Medical/Treatment Diagnosis Information:  Diagnosis: M54.50 (ICD-10-CM) - Low back pain potentially associated with radiculopathy  Insurance/Certification information:  PT Insurance Information: [employer]na Medicare - $10 copay, no precert, BMN  Physician Information:   Jayce Joseph MD  Plan of care signed (Y/N):  N  Visit# / total visits:  3/10     G-Code (if applicable):          Physical FS Primary Measure 35  Predicted Points of Change  16  Predicted # of visits   12  Predicted Discharge FS Score 51      Medicare Cap (if applicable):  N/a = total amount used, updated 2022    Time in:   7:00      Timed Treatment: 25   Total Treatment Time:  45  Time out: 7:45  ________________________________________________________________________________________    Pain Level:    /10  SUBJECTIVE:  Pt reports that she has been having a lot of the electric shock nerve pain into her L buttock and down her L LE that has kept her awake most of the last few nights. Feeling very worn down.      OBJECTIVE:     Exercise/Equipment Resistance/Repetitions Other comments          SKTC   HEP   LTR    PPT HEP   Seated trunk flexion HEP          Glute set NV if improved tolerance    clamshell NV if improved tolerance                                                                       TG with MHP NV             Other Therapeutic Activities:  Education regarding POC including demonstration with models of anatomy and physiology in order to maximize benefits of treatment    Manual Treatments:       STM to L QL and lumbar paraspinals  MFR to L gluteals  Neural tracing  Sciatic nerve tensioners and sliders    Total manual 25 minutes     Modalities:  IFC to patient tolerance with MHP x20 minutes in side-lying     Test/Measurements:  See initial eval       ASSESSMENT:    Pt reported improved tolerance to pain during estim and heat. Tolerated manual therapy without significant adverse effects, though continued to have random electrical shocks down her leg during but irregardless of treatment. Due to severe nerve pain from compression, pt tolerance to exercise is extremely minimal. Recommend continued symptom relief and progress slowly as pt tolerates, unless further non-conservative intervention becomes available to patient. Treatment/Activity Tolerance:   []Patient tolerated treatment well [] Patient limited by fatique  [x]Patient limited by pain [] Patient limited by other medical complications  [] Other:     Goals:    Short term goal 1: Pt will be indep in HEP  Short term goal 2: Pt will decrease pain 25%  Short term goal 3: Pt will increase B hip strength to 4/5  Short term goal 4: Pt will demonstrate WFL lumbar and B hip AROM  Short term goal 5: Pt will ambulate without deviation     Plan: [x] Continue per plan of care [] Alter current plan (see comments)   [] Plan of care initiated [] Hold pending MD visit [] Discharge      Plan for Next Session:  Correction of biomechanical dysfunctions as they present on visit. Restore proper motor firing pattern and functional muscle activation as presented on visit. Progress as tolerates.     Re-Certification Due Date:         Signature:  Sharon Enciso PT

## 2022-08-29 ENCOUNTER — HOSPITAL ENCOUNTER (OUTPATIENT)
Dept: PHYSICAL THERAPY | Age: 72
Setting detail: THERAPIES SERIES
Discharge: HOME | End: 2022-08-29
Payer: MEDICARE

## 2022-08-29 PROCEDURE — 97140 MANUAL THERAPY 1/> REGIONS: CPT

## 2022-08-29 PROCEDURE — G0283 ELEC STIM OTHER THAN WOUND: HCPCS

## 2022-08-29 NOTE — FLOWSHEET NOTE
Yareli  79. and Therapy, [facility] [address] [place]   Phone: [phone]  Fax [phone]    Physical Therapy Daily Treatment Note    Date:  2022    Patient Name:  Olu Xiong    :  1950  MRN: [id number]  Restrictions/Precautions:    Medical/Treatment Diagnosis Information:  Diagnosis: M54.50 (ICD-10-CM) - Low back pain potentially associated with radiculopathy  Insurance/Certification information:  PT Insurance Information: A[employer]na Medicare - $72 copay, no precert, BMN  Physician Information:   Ovi Romero MD  Plan of care signed (Y/N):  N  Visit# / total visits:  4/10     G-Code (if applicable):          Physical FS Primary Measure 35  Predicted Points of Change  16  Predicted # of visits   12  Predicted Discharge FS Score 51      Medicare Cap (if applicable):  N/a = total amount used, updated 2022    Time in:   9:20      Timed Treatment: 25   Total Treatment Time:  45  Time out: 10:05  ________________________________________________________________________________________    Pain Level:    /10  SUBJECTIVE:  Pt reports that she has been having a lot of the electric shock nerve pain into her L buttock and down her L LE that has kept her awake most of the last few nights. Feeling very worn down.  Still awaiting approval for surgery    OBJECTIVE:     Exercise/Equipment Resistance/Repetitions Other comments          SKTC   HEP   LTR    PPT HEP   Seated trunk flexion HEP          Glute set NV if improved tolerance    clamshell NV if improved tolerance                                                                       TG with MHP NV             Other Therapeutic Activities:  Education regarding POC including demonstration with models of anatomy and physiology in order to maximize benefits of treatment    Manual Treatments:       STM to L QL and lumbar paraspinals  MFR to L gluteals  Neural tracing  Sciatic nerve

## 2022-08-30 ENCOUNTER — TELEPHONE (OUTPATIENT)
Dept: ORTHOPEDIC SURGERY | Age: 72
End: 2022-08-30

## 2022-08-30 NOTE — TELEPHONE ENCOUNTER
General Question     Subject: SURGERY  [place] [facility] [place] Number: [phone]    PATIENT CALLED TO CHECK ON STATUS OF SURGERY. PATIENT STATED SHE WAS UNSURE WHETHER IT WAS APPROVED BY HER INSURANCE. PLEASE CONTACT PATIENT AT THE ABOVE NUMBER.

## 2022-08-31 ENCOUNTER — HOSPITAL ENCOUNTER (OUTPATIENT)
Dept: PHYSICAL THERAPY | Age: 72
Setting detail: THERAPIES SERIES
Discharge: HOME | End: 2022-08-31
Payer: MEDICARE

## 2022-08-31 PROCEDURE — G0283 ELEC STIM OTHER THAN WOUND: HCPCS

## 2022-08-31 PROCEDURE — 97140 MANUAL THERAPY 1/> REGIONS: CPT

## 2022-08-31 NOTE — FLOWSHEET NOTE
tensioners and sliders    Total manual 25 minutes     Modalities:  IFC to patient tolerance with MHP x20 minutes in side-lying     Test/Measurements:  See initial eval       ASSESSMENT:    Pt reported improved tolerance to pain during estim and heat. Tolerated manual therapy without significant adverse effects, though continued to have random electrical shocks down her leg during but irregardless of treatment. Due to severe nerve pain from compression, pt tolerance to exercise is extremely minimal. Recommend continued symptom relief and progress slowly as pt tolerates, unless further non-conservative intervention becomes available to patient. Treatment/Activity Tolerance:   []Patient tolerated treatment well [] Patient limited by fatique  [x]Patient limited by pain [] Patient limited by other medical complications  [] Other:     Goals:    Short term goal 1: Pt will be indep in HEP  Short term goal 2: Pt will decrease pain 25%  Short term goal 3: Pt will increase B hip strength to 4/5  Short term goal 4: Pt will demonstrate WFL lumbar and B hip AROM  Short term goal 5: Pt will ambulate without deviation     Plan: [x] Continue per plan of care [] Alter current plan (see comments)   [] Plan of care initiated [] Hold pending MD visit [] Discharge      Plan for Next Session:  Correction of biomechanical dysfunctions as they present on visit. Restore proper motor firing pattern and functional muscle activation as presented on visit. Progress as tolerates.     Re-Certification Due Date:         Signature:  Victoria Escoto, PT

## 2022-09-13 ENCOUNTER — HOSPITAL ENCOUNTER (OUTPATIENT)
Age: 72
Discharge: HOME | End: 2022-09-13
Payer: MEDICARE

## 2022-09-13 LAB
ANION GAP SERPL CALCULATED.3IONS-SCNC: 14 MMOL/L (ref 3–16)
BUN BLDV-MCNC: 15 MG/DL (ref 7–20)
CALCIUM SERPL-MCNC: 9.7 MG/DL (ref 8.3–10.6)
CHLORIDE BLD-SCNC: 102 MMOL/L (ref 99–110)
CO2: 25 MMOL/L (ref 21–32)
CREAT SERPL-MCNC: 0.8 MG/DL (ref 0.6–1.2)
EKG ATRIAL RATE: 71 BPM
EKG DIAGNOSIS: NORMAL
EKG P AXIS: 63 DEGREES
EKG P-R INTERVAL: 116 MS
EKG Q-T INTERVAL: 418 MS
EKG QRS DURATION: 94 MS
EKG QTC CALCULATION (BAZETT): 454 MS
EKG R AXIS: 25 DEGREES
EKG T AXIS: 55 DEGREES
EKG VENTRICULAR RATE: 71 BPM
GFR AFRICAN AMERICAN: >60
GFR NON-AFRICAN AMERICAN: >60
GLUCOSE BLD-MCNC: 154 MG/DL (ref 70–99)
POTASSIUM SERPL-SCNC: 4.7 MMOL/L (ref 3.5–5.1)
SODIUM BLD-SCNC: 141 MMOL/L (ref 136–145)

## 2022-09-13 PROCEDURE — 80048 BASIC METABOLIC PNL TOTAL CA: CPT

## 2022-09-13 PROCEDURE — 36415 COLL VENOUS BLD VENIPUNCTURE: CPT

## 2022-09-13 PROCEDURE — 93005 ELECTROCARDIOGRAM TRACING: CPT | Performed by: INTERNAL MEDICINE

## 2022-09-13 PROCEDURE — 83036 HEMOGLOBIN GLYCOSYLATED A1C: CPT

## 2022-09-13 PROCEDURE — 93010 ELECTROCARDIOGRAM REPORT: CPT | Performed by: INTERNAL MEDICINE

## 2022-09-13 NOTE — PROGRESS NOTES
1. Do not eat or drink anything after 12 midnight prior to surgery. This includes no water, chewing gum mints, or ice chips. You may brush your teeth and gargle the day of surgery but DO NOT SWALLOW THE WATER. 2. Please see your family doctor/pediatrician for a history and physical and/or concerning medications. Bring any test results/reports from your physician's office. If you are under the care of a heart doctor or specialist please be aware that you may be asked to see him or her for clearance. 3. You may be asked to stop blood thinners such as Coumadin, Plavix, Fragmin, and Lovenox or Anti-inflammatories such as Aspirin, Ibuprofen, Advil, and Naproxen prior to your surgery. Please check with your doctor before stopping these or any other medications. 4. Do not smoke, and do not drink any alcoholic beverages 24 hours prior to surgery. 5. You MUST make arrangements for a responsible adult to take you home after your surgery. For your safety, you will not be allowed to leave alone or drive yourself home. Your surgery will be cancelled if you do not have a ride home. Also for your safety, it is strongly suggested someone stay with you the first 24 hrs after your surgery. 6. A parent/legal guardian must accompany a child scheduled for surgery and plan to stay at the hospital until the child is discharged. Please do not bring other children with you. 7. For your comfort,please wear simple, loose fitting clothing to the hospital.  Please do not bring valuables (money, credit cards, checkbooks, etc.) Do not wear any makeup (including no eye makeup) or nail polish on your fingers or toes. 8. For your safety, please DO NOT wear any jewelry or piercings on day of surgery. All body piercing jewelry must be removed. 9. If you have dentures, they will be removed before going to the OR; for your convenience we will provide you with a container.   If you wear contact lenses or glasses, they will be removed, they will be removed, please bring a case for them. 10. If appicable,Please see your family doctor/pediatrician for a history & physical and/or concerning medications. Bring any test results/reports from your physician's office. 11. Remember to bring Blood Bank bracelet to the hospital on the day of surgery. 12. If you have a Living Will and Durable Power of  for Healthcare, please bring in a copy. 15. Notify your Surgeon if you develop any illness between now and surgery  time, cough, cold, fever, sore throat, nausea, vomiting, etc.  Please notify your surgeon if you experience dizziness, shortness of breath or blurred vision between now & the time of your surgery   14. DO NOT shave your operative site 96 hours prior to surgery. For face & neck surgery, men may use an electric razor 48 hours prior to surgery. 15. Shower the night before surgery with ___Antibacterial soap ___Hibiclens   16. To provide excellent care visitors will be limited to one in the room at any given time. 17.  Please bring picture ID and insurance card. 18.  Visit our web site for additional information:  [email or web address].

## 2022-09-14 LAB
ESTIMATED AVERAGE GLUCOSE: 171.4 MG/DL
HBA1C MFR BLD: 7.6 %

## 2022-09-15 ENCOUNTER — ANESTHESIA EVENT (OUTPATIENT)
Dept: OPERATING ROOM | Age: 72
End: 2022-09-15
Payer: MEDICARE

## 2022-09-16 ENCOUNTER — TELEPHONE (OUTPATIENT)
Dept: ORTHOPEDIC SURGERY | Age: 72
End: 2022-09-16

## 2022-09-16 ENCOUNTER — ANESTHESIA (OUTPATIENT)
Dept: OPERATING ROOM | Age: 72
End: 2022-09-16
Payer: MEDICARE

## 2022-09-16 ENCOUNTER — HOSPITAL ENCOUNTER (OUTPATIENT)
Age: 72
Setting detail: OUTPATIENT SURGERY
Discharge: HOME | End: 2022-09-16
Attending: ORTHOPAEDIC SURGERY | Admitting: ORTHOPAEDIC SURGERY
Payer: MEDICARE

## 2022-09-16 ENCOUNTER — APPOINTMENT (OUTPATIENT)
Dept: GENERAL RADIOLOGY | Age: 72
End: 2022-09-16
Attending: ORTHOPAEDIC SURGERY
Payer: MEDICARE

## 2022-09-16 VITALS
TEMPERATURE: 98.2 F | RESPIRATION RATE: 16 BRPM | DIASTOLIC BLOOD PRESSURE: 112 MMHG | OXYGEN SATURATION: 100 % | BODY MASS INDEX: 31.38 KG/M2 | HEIGHT: 63 IN | WEIGHT: 177.13 LBS | SYSTOLIC BLOOD PRESSURE: 152 MMHG

## 2022-09-16 DIAGNOSIS — M43.10 DEGENERATIVE SPONDYLOLISTHESIS: Primary | ICD-10-CM

## 2022-09-16 LAB
GLUCOSE BLD-MCNC: 122 MG/DL (ref 70–99)
GLUCOSE BLD-MCNC: 153 MG/DL (ref 70–99)
PERFORMED ON: ABNORMAL
PERFORMED ON: ABNORMAL

## 2022-09-16 PROCEDURE — 6360000002 HC RX W HCPCS: Performed by: ORTHOPAEDIC SURGERY

## 2022-09-16 PROCEDURE — 7100000001 HC PACU RECOVERY - ADDTL 15 MIN: Performed by: ORTHOPAEDIC SURGERY

## 2022-09-16 PROCEDURE — 3600000004 HC SURGERY LEVEL 4 BASE: Performed by: ORTHOPAEDIC SURGERY

## 2022-09-16 PROCEDURE — 3700000001 HC ADD 15 MINUTES (ANESTHESIA): Performed by: ORTHOPAEDIC SURGERY

## 2022-09-16 PROCEDURE — 2500000003 HC RX 250 WO HCPCS: Performed by: ORTHOPAEDIC SURGERY

## 2022-09-16 PROCEDURE — 2580000003 HC RX 258: Performed by: ORTHOPAEDIC SURGERY

## 2022-09-16 PROCEDURE — 7100000000 HC PACU RECOVERY - FIRST 15 MIN: Performed by: ORTHOPAEDIC SURGERY

## 2022-09-16 PROCEDURE — A4217 STERILE WATER/SALINE, 500 ML: HCPCS | Performed by: ORTHOPAEDIC SURGERY

## 2022-09-16 PROCEDURE — 72100 X-RAY EXAM L-S SPINE 2/3 VWS: CPT

## 2022-09-16 PROCEDURE — 2709999900 HC NON-CHARGEABLE SUPPLY: Performed by: ORTHOPAEDIC SURGERY

## 2022-09-16 PROCEDURE — 3700000000 HC ANESTHESIA ATTENDED CARE: Performed by: ORTHOPAEDIC SURGERY

## 2022-09-16 PROCEDURE — 3600000014 HC SURGERY LEVEL 4 ADDTL 15MIN: Performed by: ORTHOPAEDIC SURGERY

## 2022-09-16 PROCEDURE — 3209999900 FLUORO FOR SURGICAL PROCEDURES

## 2022-09-16 PROCEDURE — 2500000003 HC RX 250 WO HCPCS: Performed by: NURSE ANESTHETIST, CERTIFIED REGISTERED

## 2022-09-16 PROCEDURE — 6360000002 HC RX W HCPCS: Performed by: NURSE ANESTHETIST, CERTIFIED REGISTERED

## 2022-09-16 PROCEDURE — 6360000002 HC RX W HCPCS: Performed by: ANESTHESIOLOGY

## 2022-09-16 PROCEDURE — 2580000003 HC RX 258: Performed by: ANESTHESIOLOGY

## 2022-09-16 PROCEDURE — 7100000011 HC PHASE II RECOVERY - ADDTL 15 MIN: Performed by: ORTHOPAEDIC SURGERY

## 2022-09-16 PROCEDURE — 7100000010 HC PHASE II RECOVERY - FIRST 15 MIN: Performed by: ORTHOPAEDIC SURGERY

## 2022-09-16 RX ORDER — PHENYLEPHRINE HCL IN 0.9% NACL 1 MG/10 ML
SYRINGE (ML) INTRAVENOUS PRN
Status: DISCONTINUED | OUTPATIENT
Start: 2022-09-16 | End: 2022-09-16 | Stop reason: SDUPTHER

## 2022-09-16 RX ORDER — FENTANYL CITRATE 50 UG/ML
INJECTION, SOLUTION INTRAMUSCULAR; INTRAVENOUS PRN
Status: DISCONTINUED | OUTPATIENT
Start: 2022-09-16 | End: 2022-09-16 | Stop reason: SDUPTHER

## 2022-09-16 RX ORDER — ONDANSETRON 2 MG/ML
INJECTION INTRAMUSCULAR; INTRAVENOUS PRN
Status: DISCONTINUED | OUTPATIENT
Start: 2022-09-16 | End: 2022-09-16 | Stop reason: SDUPTHER

## 2022-09-16 RX ORDER — OXYCODONE HYDROCHLORIDE AND ACETAMINOPHEN 5; 325 MG/1; MG/1
2 TABLET ORAL EVERY 4 HOURS PRN
Qty: 40 TABLET | Refills: 0 | Status: SHIPPED | OUTPATIENT
Start: 2022-09-16 | End: 2022-10-11

## 2022-09-16 RX ORDER — SODIUM CHLORIDE 9 MG/ML
INJECTION, SOLUTION INTRAVENOUS PRN
Status: DISCONTINUED | OUTPATIENT
Start: 2022-09-16 | End: 2022-09-16 | Stop reason: HOSPADM

## 2022-09-16 RX ORDER — MEPERIDINE HYDROCHLORIDE 50 MG/ML
12.5 INJECTION INTRAMUSCULAR; INTRAVENOUS; SUBCUTANEOUS EVERY 5 MIN PRN
Status: DISCONTINUED | OUTPATIENT
Start: 2022-09-16 | End: 2022-09-16 | Stop reason: HOSPADM

## 2022-09-16 RX ORDER — ROCURONIUM BROMIDE 10 MG/ML
INJECTION, SOLUTION INTRAVENOUS PRN
Status: DISCONTINUED | OUTPATIENT
Start: 2022-09-16 | End: 2022-09-16 | Stop reason: SDUPTHER

## 2022-09-16 RX ORDER — ONDANSETRON 2 MG/ML
4 INJECTION INTRAMUSCULAR; INTRAVENOUS
Status: DISCONTINUED | OUTPATIENT
Start: 2022-09-16 | End: 2022-09-16 | Stop reason: HOSPADM

## 2022-09-16 RX ORDER — BUPIVACAINE HYDROCHLORIDE AND EPINEPHRINE 2.5; 5 MG/ML; UG/ML
INJECTION, SOLUTION EPIDURAL; INFILTRATION; INTRACAUDAL; PERINEURAL PRN
Status: DISCONTINUED | OUTPATIENT
Start: 2022-09-16 | End: 2022-09-16 | Stop reason: ALTCHOICE

## 2022-09-16 RX ORDER — LIDOCAINE HYDROCHLORIDE 10 MG/ML
1 INJECTION, SOLUTION EPIDURAL; INFILTRATION; INTRACAUDAL; PERINEURAL
Status: DISCONTINUED | OUTPATIENT
Start: 2022-09-16 | End: 2022-09-16 | Stop reason: HOSPADM

## 2022-09-16 RX ORDER — ONDANSETRON 2 MG/ML
INJECTION INTRAMUSCULAR; INTRAVENOUS
Status: DISCONTINUED
Start: 2022-09-16 | End: 2022-09-16 | Stop reason: HOSPADM

## 2022-09-16 RX ORDER — SODIUM CHLORIDE 0.9 % (FLUSH) 0.9 %
5-40 SYRINGE (ML) INJECTION EVERY 12 HOURS SCHEDULED
Status: DISCONTINUED | OUTPATIENT
Start: 2022-09-16 | End: 2022-09-16 | Stop reason: HOSPADM

## 2022-09-16 RX ORDER — DROPERIDOL 2.5 MG/ML
0.62 INJECTION, SOLUTION INTRAMUSCULAR; INTRAVENOUS
Status: COMPLETED | OUTPATIENT
Start: 2022-09-16 | End: 2022-09-16

## 2022-09-16 RX ORDER — SODIUM CHLORIDE 0.9 % (FLUSH) 0.9 %
5-40 SYRINGE (ML) INJECTION PRN
Status: DISCONTINUED | OUTPATIENT
Start: 2022-09-16 | End: 2022-09-16 | Stop reason: HOSPADM

## 2022-09-16 RX ORDER — GABAPENTIN 300 MG/1
300 CAPSULE ORAL 3 TIMES DAILY
Qty: 30 CAPSULE | Refills: 0 | Status: SHIPPED | OUTPATIENT
Start: 2022-09-16 | End: 2022-09-26

## 2022-09-16 RX ORDER — DEXAMETHASONE SODIUM PHOSPHATE 4 MG/ML
INJECTION, SOLUTION INTRA-ARTICULAR; INTRALESIONAL; INTRAMUSCULAR; INTRAVENOUS; SOFT TISSUE PRN
Status: DISCONTINUED | OUTPATIENT
Start: 2022-09-16 | End: 2022-09-16 | Stop reason: SDUPTHER

## 2022-09-16 RX ORDER — SODIUM CHLORIDE, SODIUM LACTATE, POTASSIUM CHLORIDE, CALCIUM CHLORIDE 600; 310; 30; 20 MG/100ML; MG/100ML; MG/100ML; MG/100ML
INJECTION, SOLUTION INTRAVENOUS CONTINUOUS
Status: DISCONTINUED | OUTPATIENT
Start: 2022-09-16 | End: 2022-09-16 | Stop reason: HOSPADM

## 2022-09-16 RX ORDER — DIPHENHYDRAMINE HYDROCHLORIDE 50 MG/ML
12.5 INJECTION INTRAMUSCULAR; INTRAVENOUS
Status: COMPLETED | OUTPATIENT
Start: 2022-09-16 | End: 2022-09-16

## 2022-09-16 RX ORDER — LIDOCAINE HYDROCHLORIDE 10 MG/ML
INJECTION, SOLUTION EPIDURAL; INFILTRATION; INTRACAUDAL; PERINEURAL PRN
Status: DISCONTINUED | OUTPATIENT
Start: 2022-09-16 | End: 2022-09-16 | Stop reason: SDUPTHER

## 2022-09-16 RX ORDER — OXYCODONE HYDROCHLORIDE 5 MG/1
10 TABLET ORAL PRN
Status: DISCONTINUED | OUTPATIENT
Start: 2022-09-16 | End: 2022-09-16 | Stop reason: HOSPADM

## 2022-09-16 RX ORDER — PROPOFOL 10 MG/ML
INJECTION, EMULSION INTRAVENOUS PRN
Status: DISCONTINUED | OUTPATIENT
Start: 2022-09-16 | End: 2022-09-16 | Stop reason: SDUPTHER

## 2022-09-16 RX ORDER — LABETALOL HYDROCHLORIDE 5 MG/ML
5 INJECTION, SOLUTION INTRAVENOUS EVERY 10 MIN PRN
Status: DISCONTINUED | OUTPATIENT
Start: 2022-09-16 | End: 2022-09-16 | Stop reason: HOSPADM

## 2022-09-16 RX ORDER — OXYCODONE HYDROCHLORIDE 5 MG/1
5 TABLET ORAL PRN
Status: DISCONTINUED | OUTPATIENT
Start: 2022-09-16 | End: 2022-09-16 | Stop reason: HOSPADM

## 2022-09-16 RX ORDER — DOCUSATE SODIUM 100 MG/1
100 CAPSULE, LIQUID FILLED ORAL 2 TIMES DAILY PRN
Qty: 30 CAPSULE | Refills: 1 | Status: SHIPPED | OUTPATIENT
Start: 2022-09-16

## 2022-09-16 RX ADMIN — HYDROMORPHONE HYDROCHLORIDE 0.5 MG: 1 INJECTION, SOLUTION INTRAMUSCULAR; INTRAVENOUS; SUBCUTANEOUS at 15:10

## 2022-09-16 RX ADMIN — Medication 50 MCG: at 13:39

## 2022-09-16 RX ADMIN — SUGAMMADEX 200 MG: 100 INJECTION, SOLUTION INTRAVENOUS at 14:27

## 2022-09-16 RX ADMIN — ONDANSETRON 4 MG: 2 INJECTION INTRAMUSCULAR; INTRAVENOUS at 14:53

## 2022-09-16 RX ADMIN — DEXAMETHASONE SODIUM PHOSPHATE 4 MG: 4 INJECTION, SOLUTION INTRAMUSCULAR; INTRAVENOUS at 13:30

## 2022-09-16 RX ADMIN — PROPOFOL 80 MG: 10 INJECTION, EMULSION INTRAVENOUS at 14:20

## 2022-09-16 RX ADMIN — SODIUM CHLORIDE, SODIUM LACTATE, POTASSIUM CHLORIDE, AND CALCIUM CHLORIDE: .6; .31; .03; .02 INJECTION, SOLUTION INTRAVENOUS at 12:00

## 2022-09-16 RX ADMIN — ROCURONIUM BROMIDE 50 MG: 10 SOLUTION INTRAVENOUS at 13:08

## 2022-09-16 RX ADMIN — ONDANSETRON 4 MG: 2 INJECTION INTRAMUSCULAR; INTRAVENOUS at 14:10

## 2022-09-16 RX ADMIN — Medication 100 MCG: at 14:03

## 2022-09-16 RX ADMIN — FENTANYL CITRATE 50 MCG: 50 INJECTION INTRAMUSCULAR; INTRAVENOUS at 13:06

## 2022-09-16 RX ADMIN — LIDOCAINE HYDROCHLORIDE 60 MG: 10 INJECTION, SOLUTION EPIDURAL; INFILTRATION; INTRACAUDAL; PERINEURAL at 13:07

## 2022-09-16 RX ADMIN — Medication 50 MCG: at 13:48

## 2022-09-16 RX ADMIN — SODIUM CHLORIDE, SODIUM LACTATE, POTASSIUM CHLORIDE, AND CALCIUM CHLORIDE: .6; .31; .03; .02 INJECTION, SOLUTION INTRAVENOUS at 14:15

## 2022-09-16 RX ADMIN — PROPOFOL 120 MG: 10 INJECTION, EMULSION INTRAVENOUS at 13:07

## 2022-09-16 RX ADMIN — DIPHENHYDRAMINE HYDROCHLORIDE 12.5 MG: 50 INJECTION, SOLUTION INTRAMUSCULAR; INTRAVENOUS at 16:20

## 2022-09-16 RX ADMIN — Medication 100 MCG: at 13:57

## 2022-09-16 RX ADMIN — FENTANYL CITRATE 50 MCG: 50 INJECTION INTRAMUSCULAR; INTRAVENOUS at 14:29

## 2022-09-16 RX ADMIN — HYDROMORPHONE HYDROCHLORIDE 0.5 MG: 1 INJECTION, SOLUTION INTRAMUSCULAR; INTRAVENOUS; SUBCUTANEOUS at 14:57

## 2022-09-16 RX ADMIN — DROPERIDOL 0.62 MG: 2.5 INJECTION, SOLUTION INTRAMUSCULAR; INTRAVENOUS at 16:25

## 2022-09-16 RX ADMIN — CEFAZOLIN 2000 MG: 10 INJECTION, POWDER, FOR SOLUTION INTRAVENOUS at 13:19

## 2022-09-16 ASSESSMENT — PAIN DESCRIPTION - LOCATION: LOCATION: BACK

## 2022-09-16 ASSESSMENT — PAIN DESCRIPTION - DESCRIPTORS: DESCRIPTORS: CRAMPING;DULL

## 2022-09-16 ASSESSMENT — PAIN DESCRIPTION - ORIENTATION: ORIENTATION: MID

## 2022-09-16 ASSESSMENT — PAIN SCALES - GENERAL
PAINLEVEL_OUTOF10: 7
PAINLEVEL_OUTOF10: 7

## 2022-09-16 NOTE — PROGRESS NOTES
Discharge instructions reviewed with daughter and pt at bedside, both verbalize understanding. Written instructions given to daughter. Prescriptions placed in pts purse. IV removed and pt transported to private vehicle via wheelchair.

## 2022-09-16 NOTE — DISCHARGE INSTR - DIET
Good nutrition is important when healing from an illness, injury, or surgery. Follow any nutrition recommendations given to you during your hospital stay. If you were given an oral nutrition supplement while in the hospital, continue to take this supplement at home. You can take it with meals, in-between meals, and/or before bedtime. These supplements can be purchased at most local grocery stores, pharmacies, and chain [employer]-stores. If you have any questions about your diet or nutrition, call the hospital and ask for the dietitian.   Advance to your regular diet as tolerated

## 2022-09-16 NOTE — OP NOTE
Operative Note      Patient: iTsh Mckeon  YOB: 1950  MRN: [id number]    Date of Procedure: 9/16/2022    Pre-Op Diagnosis: Spinal stenosis    Post-Op Diagnosis: Same       Procedure(s): MICROLUMBAR LAMINECTOMY LUMBAR 4 - LUMBAR 5    Surgeon(s):  Jaime Chong MD    Assistant:   Surgical Assistant: Ewa Sal    Anesthesia: General    Estimated Blood Loss (mL): less than 50     Complications: None    Specimens:   * No specimens in log *    Implants:  * No implants in log *      Drains: * No LDAs found *    Findings: stenosis    Detailed Description of Procedure:     INDICATIONS FOR SURGERY: Dina Mott  is a 81 y.o. female with back and left greater than right leg pain. The symptoms failed to respond to conservative intervention. An MRI scan was performed and this showed evidence of severe spinal stenosis L4-5. Having failed conservative management and experiencing persistent symptoms, the patient elected to proceed ahead with the surgical option listed above. DETAILS OF PROCEDURE: The patient was brought to the operating room and placed under general anesthesia. The patient was then placed prone on a Margarita Cooley table. All bony prominences were inspected and padded prior to sterile draping. Using a #10 blade knife, the skin was incised in the midline and monopolar cautery was used to dissect through the subcutaneous tissue to open the fascia and reflect the paraspinal muscles laterally exposing the L4-5 interspace on the left side. The microscope was then brought into the field and used to assist with performing a microsurgical hemilaminotomy, partial facetectomy and foraminotomy. Using the Midas Russell drill, I burred down the hemilamina of L4 and the medial portion of the facet joint.  The underlying ligamentum flavum was freed with the micronerve hook from the underlying dura and removed with the 3 mm punch laterally, exposing the lateral dural tube and takeoff of the L5 nerve root. I carefully dissected the ligamentum flavum from the dura using a micronerve hook and removed it. I then performed a foraminotomy with a 3 mm punch. I retracted the L5 root and removed disc material with a pituitary forceps. The L5 nerve root and the thecal sac were identified and noted to be without dura tears. I then made an incision in the fascia to the right of the spinous process. I then performed a right hemilaminotomy, partial facetectomy and foraminotomy at the L4-5 using the previously described method. The wound was copiously irrigated with antibiotic solution. 0.5% Marcaine in subcutaneous tissues for analgesia. The fascia was then reapproximated using interrupted 0 Vicryl sutures and interrupted 3-0 Vicryl sutures followed by a 4-0 Vicryl subcuticular suture were used to reapproximate the subcuticular layer. A sterile dressing was then applied. The patient was extubated in the operating room and transferred to the recovery room in stable condition. There were no complications. Marcos Barnhart M.D.       Electronically signed by Carlos Paredes MD on 9/16/2022 at 2:15 PM

## 2022-09-16 NOTE — H&P
I have reviewed the history and physical and examined the patient and find no relevant changes. I have reviewed with the patient and/or family the risks, benefits, and alternatives to the procedure. The patient was counseled at length about the risks of arnoldo Covid-19 during their perioperative period and any recovery window from their procedure. The patient was made aware that arnoldo Covid-19  may worsen their prognosis for recovering from their procedure  and lend to a higher morbidity and/or mortality risk. All material risks, benefits, and reasonable alternatives including postponing the procedure were discussed. The patient does wish to proceed with the procedure at this time. Stacy Harris MD  9/16/2022 No intake/output data recorded.

## 2022-09-16 NOTE — LETTER
Billing and Coding Fee Ticket    Name:FLOR MCGREGOR  : 1950  Diagnosis: spinal stenosis L4-5  Surgeon: Kirk Ortiz    DOS: 22    Procedure:  1.   Microlumbar bilateral laminotomy, partial facetectomy, and foraminotomy L4-5 84[address]

## 2022-09-16 NOTE — ANESTHESIA PRE PROCEDURE
Department of Anesthesiology  Preprocedure Note       Name:  Yolande Mcardle   Age:  81 y.o.  :  1950                                          MRN:  [id number]         Date:  2022      Surgeon: González Gutierrez):  Agnes Sainz MD    Procedure: Procedure(s): MICROLUMBAR LAMINECTOMY LUMBAR 4 - LUMBAR 5    Medications prior to admission:   Prior to Admission medications    Medication Sig Start Date End Date Taking? Authorizing Provider   mupirocin (BACTROBAN) 2 % ointment Apply 2cm to each nostril BID for 5 days prior to surgery 22   Agnes Sainz MD   ondansetron (ZOFRAN ODT) 4 MG disintegrating tablet Take 1 tablet by mouth every 8 hours as needed for Nausea or Vomiting 22   Alfred Manzo MD   sitagliptan (JANUVIA) 50 MG tablet Take 50 mg by mouth daily Indications: unsure of dosage    Historical Provider, MD   atorvastatin (LIPITOR) 10 MG tablet Take  by mouth daily. Indications: unsure of dosage    Historical Provider, MD   lisinopril (PRINIVIL;ZESTRIL) 10 MG tablet Take  by mouth daily. Indications: unsure of dosage    Historical Provider, MD       Current medications:    Current Facility-Administered Medications   Medication Dose Route Frequency Provider Last Rate Last Admin    ceFAZolin (ANCEF) 2000 mg in dextrose 5 % 100 mL IVPB  2,000 mg IntraVENous On Call to Peter Bennett MD        lidocaine PF 1 % injection 1 mL  1 mL IntraDERmal Once PRN Elisabte Berry MD        lactated ringers infusion   IntraVENous Continuous Elisabet Berry MD        sodium chloride flush 0.9 % injection 5-40 mL  5-40 mL IntraVENous 2 times per day Elisabet Berry MD        sodium chloride flush 0.9 % injection 5-40 mL  5-40 mL IntraVENous PRN Elisabet Berry MD        0.9 % sodium chloride infusion   IntraVENous PRN Elisabet Berry MD           Allergies:  No Known Allergies    Problem List:  There is no problem list on file for this patient.       Past Medical History: Diagnosis Date    Bronchitis     Diabetes mellitus ([facility] [address]     Hyperlipidemia     Hypertension     PONV (postoperative nausea and vomiting)        Past Surgical History:        Procedure Laterality Date    CHOLECYSTECTOMY      HYSTERECTOMY (CERVIX STATUS UNKNOWN)      KIDNEY STONE SURGERY      TONSILLECTOMY         Social History:    Social History     Tobacco Use    Smoking status: Never    Smokeless tobacco: Never   Substance Use Topics    Alcohol use: No                                Counseling given: Not Answered      Vital Signs (Current):   Vitals:    09/13/22 1219 09/16/22 1054   BP:  (!) 152/87   Pulse:  84   Resp:  16   Temp:  98.2 °F (36.8 °C)   TempSrc:  Temporal   SpO2:  96%   Weight: 171 lb (77.6 kg) 177 lb 2 oz (80.3 kg)   Height: 5' 3\" (1.6 m) 5' 3\" (1.6 m)                                              BP Readings from Last 3 Encounters:   09/16/22 (!) 152/87   02/06/22 (!) 167/77       NPO Status: Time of last liquid consumption: 0430                        Time of last solid consumption: 1730                        Date of last liquid consumption: 09/15/22                        Date of last solid food consumption: 09/15/22    BMI:   Wt Readings from Last 3 Encounters:   09/16/22 177 lb 2 oz (80.3 kg)   08/03/22 155 lb (70.3 kg)   02/06/22 155 lb (70.3 kg)     Body mass index is 31.38 kg/m².     CBC:   Lab Results   Component Value Date/Time    WBC 8.5 02/06/2022 12:25 AM    RBC 4.76 02/06/2022 12:25 AM    HGB 14.2 02/06/2022 12:25 AM    HCT 43.1 02/06/2022 12:25 AM    MCV 90.6 02/06/2022 12:25 AM    RDW 13.3 02/06/2022 12:25 AM     02/06/2022 12:25 AM       CMP:   Lab Results   Component Value Date/Time     09/13/2022 09:42 AM    K 4.7 09/13/2022 09:42 AM    K 4.3 02/06/2022 12:25 AM     09/13/2022 09:42 AM    CO2 25 09/13/2022 09:42 AM    BUN 15 09/13/2022 09:42 AM    CREATININE 0.8 09/13/2022 09:42 AM    GFRAA >60 09/13/2022 09:42 AM    GFRAA >60 02/25/2010 09:15 AM AGRATIO 1.9 02/06/2022 12:25 AM    LABGLOM >60 09/13/2022 09:42 AM    GLUCOSE 154 09/13/2022 09:42 AM    PROT 6.6 02/06/2022 12:25 AM    CALCIUM 9.7 09/13/2022 09:42 AM    BILITOT 0.4 02/06/2022 12:25 AM    ALKPHOS 105 02/06/2022 12:25 AM    AST 13 02/06/2022 12:25 AM    ALT 14 02/06/2022 12:25 AM       POC Tests:   Recent Labs     09/16/22  1126   POCGLU 153*       Coags: No results found for: PROTIME, INR, APTT    HCG (If Applicable): No results found for: PREGTESTUR, PREGSERUM, HCG, HCGQUANT     ABGs: No results found for: PHART, PO2ART, LZL8NRA, VJH5QBS, BEART, Z5KELQXW     Type & Screen (If Applicable):  No results found for: LABABO, LABRH    Drug/Infectious Status (If Applicable):  No results found for: HIV, HEPCAB    COVID-19 Screening (If Applicable): No results found for: COVID19        Anesthesia Evaluation  Patient summary reviewed and Nursing notes reviewed   history of anesthetic complications: PONV. Airway: Mallampati: II  TM distance: >3 FB   Neck ROM: full  Mouth opening: > = 3 FB   Dental: normal exam         Pulmonary:Negative Pulmonary ROS and normal exam  breath sounds clear to auscultation                             Cardiovascular:    (+) hypertension:,         Rhythm: regular  Rate: normal                    Neuro/Psych:   Negative Neuro/Psych ROS              GI/Hepatic/Renal: Neg GI/Hepatic/Renal ROS            Endo/Other:    (+) DiabetesType II DM, , .                 Abdominal:   (+) obese,           Vascular: negative vascular ROS. Other Findings:           Anesthesia Plan      general     ASA 2       Induction: intravenous. MIPS: Postoperative opioids intended and Prophylactic antiemetics administered. Anesthetic plan and risks discussed with patient. Plan discussed with CRNA.                     Ronda Wright MD   9/16/2022

## 2022-09-19 NOTE — ANESTHESIA POSTPROCEDURE EVALUATION
Department of Anesthesiology  Postprocedure Note    Patient: Lisa Wade  MRN: [id number]  YOB: 1950  Date of evaluation: 9/19/2022      Procedure Summary     Date: 09/16/22 Room / Location: [facility]    Anesthesia Start: 7605 Anesthesia Stop: 3103    Procedure: MICROLUMBAR LAMINECTOMY LUMBAR 4 - LUMBAR 5 (Back) Diagnosis:       Left low back pain, unspecified chronicity, unspecified whether sciatica present      (LOW BACK PAIN POTENTIALLY ASSOCIATED WITH RADICULOPATHY)    Surgeons: Dany Franklin MD Responsible Provider: Fani Mercer MD    Anesthesia Type: general ASA Status: 2          Anesthesia Type: No value filed.     Lan Phase I: Lan Score: 9    Lan Phase II: Lan Score: 10      Anesthesia Post Evaluation    Patient location during evaluation: PACU  Patient participation: complete - patient participated  Level of consciousness: awake and alert  Pain score: 0  Airway patency: patent  Nausea & Vomiting: no nausea and no vomiting  Complications: no  Cardiovascular status: blood pressure returned to baseline  Respiratory status: acceptable  Hydration status: stable

## 2022-09-29 ENCOUNTER — OFFICE VISIT (OUTPATIENT)
Dept: ORTHOPEDIC SURGERY | Age: 72
End: 2022-09-29

## 2022-09-29 DIAGNOSIS — M48.062 LUMBAR STENOSIS WITH NEUROGENIC CLAUDICATION: Primary | ICD-10-CM

## 2022-09-29 PROCEDURE — 99024 POSTOP FOLLOW-UP VISIT: CPT | Performed by: ORTHOPAEDIC SURGERY

## 2022-09-29 NOTE — PROGRESS NOTES
Ms. Mccain Sees returns today 2 weeks s/p MLD and ML L L4-L5 for severe stenosis and left greater than right leg pain. She reports marked improvement of her leg symptoms. However her right leg continues to feel weak intermittently    Her incisions show no signs of infection. She has a normal gait and 5/5 strength of her ankle DFs/PFs, bilaterally. Her sensation is intact from L3 to S1 bilaterally. I cautioned her to avoid lifting more than 10 pounds, bending and impact type aerobic exercise for 8 week after surgery, then slowly increase her activity over the next month.

## 2022-10-03 ENCOUNTER — TELEPHONE (OUTPATIENT)
Dept: ORTHOPEDIC SURGERY | Age: 72
End: 2022-10-03

## 2022-11-10 ENCOUNTER — TELEPHONE (OUTPATIENT)
Dept: ORTHOPEDIC SURGERY | Age: 72
End: 2022-11-10

## 2022-11-10 NOTE — TELEPHONE ENCOUNTER
Other PATIENT IS CALLING REQUESTING A CALL BACK. PATIENT IS EXPERIENCING SOME PAIN AFTER SURGERY.  [facility] [address]

## 2022-11-17 ENCOUNTER — OFFICE VISIT (OUTPATIENT)
Dept: ORTHOPEDIC SURGERY | Age: 72
End: 2022-11-17

## 2022-11-17 VITALS — BODY MASS INDEX: 31.36 KG/M2 | WEIGHT: 177 LBS | HEIGHT: 63 IN

## 2022-11-17 DIAGNOSIS — M48.062 LUMBAR STENOSIS WITH NEUROGENIC CLAUDICATION: Primary | ICD-10-CM

## 2022-11-17 PROCEDURE — 99024 POSTOP FOLLOW-UP VISIT: CPT | Performed by: ORTHOPAEDIC SURGERY

## 2022-11-17 RX ORDER — METHYLPREDNISOLONE 4 MG/1
TABLET ORAL
Qty: 1 KIT | Refills: 0 | Status: SHIPPED | OUTPATIENT
Start: 2022-11-17 | End: 2022-11-23

## 2022-11-17 RX ORDER — GABAPENTIN 300 MG/1
300 CAPSULE ORAL 4 TIMES DAILY
Qty: 90 CAPSULE | Refills: 1 | Status: SHIPPED | OUTPATIENT
Start: 2022-11-17 | End: 2023-01-01

## 2022-11-17 NOTE — PROGRESS NOTES
Ms. Blanca Noe returns today 2 months s/p MLD and ML L L4-L5 for severe stenosis and left greater than right leg pain. She reports a 10-day history of recurrent L5 distribution symptoms. Her incisions show no signs of infection. She has a normal gait and 5/5 strength of her ankle DFs/PFs, bilaterally. Her sensation is intact from L3 to S1 bilaterally. Assessment  Recurrent disc herniation left L4-L5    I recommend a course of oral steroids and gabapentin. She will call to schedule repeat lumbar MRI with and without gadolinium if her symptoms persist after those.

## 2022-11-28 ENCOUNTER — TELEPHONE (OUTPATIENT)
Dept: ORTHOPEDIC SURGERY | Age: 72
End: 2022-11-28

## 2022-11-28 ENCOUNTER — HOSPITAL ENCOUNTER (OUTPATIENT)
Dept: WOMENS IMAGING | Age: 72
Discharge: HOME | End: 2022-11-28
Payer: MEDICARE

## 2022-11-28 VITALS — WEIGHT: 166 LBS | HEIGHT: 62 IN | BODY MASS INDEX: 30.55 KG/M2

## 2022-11-28 DIAGNOSIS — M54.50 LOW BACK PAIN POTENTIALLY ASSOCIATED WITH RADICULOPATHY: ICD-10-CM

## 2022-11-28 DIAGNOSIS — M48.062 LUMBAR STENOSIS WITH NEUROGENIC CLAUDICATION: Primary | ICD-10-CM

## 2022-11-28 DIAGNOSIS — Z12.31 VISIT FOR SCREENING MAMMOGRAM: ICD-10-CM

## 2022-11-28 PROCEDURE — 77067 SCR MAMMO BI INCL CAD: CPT

## 2022-11-28 NOTE — TELEPHONE ENCOUNTER
General Question     Subject: PATIENT'S PAIN ISN'T GETTING ANY BETTER.     Patient: Sonja Farr Number: [phone]

## 2022-12-02 ENCOUNTER — TELEPHONE (OUTPATIENT)
Dept: ORTHOPEDIC SURGERY | Age: 72
End: 2022-12-02

## 2022-12-02 NOTE — TELEPHONE ENCOUNTER
General Question     Subject: PATIENT IS NEEDING MRI ORDER FOR LUMBAR SENT TO THE [facility] IN [facility].  PLEASE ADVISE   Patient: Heladio Lourdes  Contact Number: [phone]

## 2022-12-29 ENCOUNTER — TELEPHONE (OUTPATIENT)
Dept: ORTHOPEDIC SURGERY | Age: 72
End: 2022-12-29

## 2022-12-29 NOTE — TELEPHONE ENCOUNTER
Spoke to her about her MRI and she is going to think about it and let me know if she wants to come in.

## 2022-12-29 NOTE — TELEPHONE ENCOUNTER
----- Message from Blue Lea MD sent at 12/29/2022  2:36 PM EST -----  Lumbar MRI  Substantial improvement of nerve compression  Symptoms likely to improve with time  Could try CINDY  Does she want more gabapentin?

## 2023-01-04 ENCOUNTER — OFFICE VISIT (OUTPATIENT)
Dept: ORTHOPEDIC SURGERY | Age: 73
End: 2023-01-04
Payer: MEDICARE

## 2023-01-04 VITALS — HEIGHT: 62 IN | BODY MASS INDEX: 32.57 KG/M2 | WEIGHT: 177 LBS

## 2023-01-04 DIAGNOSIS — M48.062 LUMBAR STENOSIS WITH NEUROGENIC CLAUDICATION: Primary | ICD-10-CM

## 2023-01-04 PROCEDURE — 99213 OFFICE O/P EST LOW 20 MIN: CPT | Performed by: ORTHOPAEDIC SURGERY

## 2023-01-04 PROCEDURE — 1123F ACP DISCUSS/DSCN MKR DOCD: CPT | Performed by: ORTHOPAEDIC SURGERY

## 2023-01-04 NOTE — PROGRESS NOTES
New Patient: LUMBAR SPINE    Referring Provider:  No ref. provider found    CHIEF COMPLAINT:    Chief Complaint   Patient presents with    Lower Back Pain     HX of MLL 9/16/22, MRI RESULTS       HISTORY OF PRESENT ILLNESS:    Ms. Gabriela Melara  is month status post MLD and ML L L4-L5 for severe stenosis with left greater than right leg pain. She continues to pain of her left buttock. .  Her pain is worse with prolonged sitting standing and change of position. She minimal symptom and below her knee. Current/Past Treatment:   Physical Therapy: Yes  Chiropractic: No  Injection: before surgery   Medications: Gabapentin    Past Medical History:   Past Medical History:   Diagnosis Date    Bronchitis     Diabetes mellitus ([facility] [address]     Hyperlipidemia     Hypertension     PONV (postoperative nausea and vomiting)       Past Surgical History:     Past Surgical History:   Procedure Laterality Date    CHOLECYSTECTOMY      HYSTERECTOMY (CERVIX STATUS UNKNOWN)      KIDNEY STONE SURGERY      LUMBAR SPINE SURGERY N/A 9/16/2022    MICROLUMBAR LAMINECTOMY LUMBAR 4 - LUMBAR 5 performed by Raji Win MD at [place] [address]       Current Medications:     Current Outpatient Medications:     gabapentin (NEURONTIN) 300 MG capsule, Take 1 capsule by mouth 4 times daily for 180 doses. , Disp: 90 capsule, Rfl: 1    gabapentin (NEURONTIN) 300 MG capsule, Take 1 capsule by mouth 3 times daily for 30 doses. , Disp: 30 capsule, Rfl: 0    docusate sodium (COLACE) 100 MG capsule, Take 1 capsule by mouth 2 times daily as needed for Constipation, Disp: 30 capsule, Rfl: 1    sitagliptan (JANUVIA) 50 MG tablet, Take 50 mg by mouth daily Indications: unsure of dosage, Disp: , Rfl:     atorvastatin (LIPITOR) 10 MG tablet, Take  by mouth daily. Indications: unsure of dosage, Disp: , Rfl:     lisinopril (PRINIVIL;ZESTRIL) 10 MG tablet, Take  by mouth daily.  Indications: unsure of dosage, Disp: , Rfl:   Allergies:  Patient has no known allergies. Social History:    reports that she has never smoked. She has never used smokeless tobacco. She reports that she does not drink alcohol and does not use drugs. Family History:   Family History   Problem Relation Age of Onset    Diabetes Father     Heart Disease Father     Cancer Brother        REVIEW OF SYSTEMS: Full ROS noted & scanned   CONSTITUTIONAL: Denies unexplained weight loss, fevers, chills or fatigue  NEUROLOGICAL: Denies unsteady gait or progressive weakness  MUSCULOSKELETAL: Denies joint swelling or redness  PSYCHOLOGICAL: Denies anxiety, depression   SKIN: Denies skin changes, delayed healing, rash, itching   HEMATOLOGIC: Denies easy bleeding or bruising  ENDOCRINE: Denies excessive thirst, urination, heat/cold  RESPIRATORY: Denies current dyspnea, cough  GI: Denies nausea, vomiting, diarrhea   : Denies bowel or bladder issues      PHYSICAL EXAM:    Vitals: Height 5' 2\" (1.575 m), weight 177 lb (80.3 kg). GENERAL EXAM:  General Apparence: Patient is adequately groomed with no evidence of malnutrition. Orientation: The patient is oriented to time, place and person. Mood & Affect:The patient's mood and affect are appropriate. Vascular: Examination reveals no swelling tenderness in upper or lower extremities. Good capillary refill. Lymphatic: The lymphatic examination bilaterally reveals all areas to be without enlargement or induration  Sensation: Sensation is intact without deficit  Coordination/Balance: Good coordination     LUMBAR/SACRAL EXAMINATION:  Inspection: Local inspection shows no step-off or bruising. Lumbar alignment is normal.  Sagittal and Coronal balance is neutral.      Palpation:   No evidence of tenderness at the midline. No tenderness bilaterally at the paraspinal or trochanters. There is no step-off or paraspinal spasm. Range of Motion: Lumbar flexion, extension and rotation are mildly limited due to pain.   Strength:   Strength testing is 5/5 in all muscle groups tested. Special Tests:   Straight leg raise and crossed SLR negative. Leg length and pelvis level. Skin: There are no rashes, ulcerations or lesions. Reflexes: Reflexes are symmetrically 2+ at the patellar and ankle tendons. Clonus absent bilaterally at the feet. Gait & station: normal, patient ambulates without assistance    Additional Examinations:   RIGHT LOWER EXTREMITY: Inspection/examination of the right lower extremity does not show any tenderness, deformity or injury. Range of motion is unremarkable. There is no gross instability. There are no rashes, ulcerations or lesions. Strength and tone are normal.    LEFT LOWER EXTREMITY:  Inspection/examination of the left lower extremity does not show any tenderness, deformity or injury. Range of motion is unremarkable. There is no gross instability. There are no rashes, ulcerations or lesions. Strength and tone are normal.    Diagnostic Testing:    Reviewed MRI images of her lumbar spine from 12/27/2022 in the office today post laminotomy changes left L4-L5 and a central disc protrusion at L5-S1    Impression:   Lumbar degenerative disc disease    Plan:    Discussed treatment options including observation, physical therapy, epidural injection, and additional imaging.  She would like to proceed with epidural injection

## 2023-06-20 ENCOUNTER — HOSPITAL ENCOUNTER (OUTPATIENT)
Dept: VASCULAR LAB | Age: 73
Discharge: HOME | End: 2023-06-20
Payer: MEDICARE

## 2023-06-20 ENCOUNTER — HOSPITAL ENCOUNTER (OUTPATIENT)
Dept: WOMENS IMAGING | Age: 73
Discharge: HOME | End: 2023-06-20
Payer: MEDICARE

## 2023-06-20 DIAGNOSIS — Z78.0 MENOPAUSE: ICD-10-CM

## 2023-06-20 DIAGNOSIS — E11.65 INADEQUATELY CONTROLLED DIABETES MELLITUS (HCC): ICD-10-CM

## 2023-06-20 PROCEDURE — 93922 UPR/L XTREMITY ART 2 LEVELS: CPT

## 2023-06-20 PROCEDURE — 77080 DXA BONE DENSITY AXIAL: CPT

## 2024-01-03 ENCOUNTER — HOSPITAL ENCOUNTER (OUTPATIENT)
Dept: WOMENS IMAGING | Age: 74
Discharge: HOME | End: 2024-01-03
Payer: MEDICARE

## 2024-01-03 VITALS — WEIGHT: 170 LBS | BODY MASS INDEX: 32.1 KG/M2 | HEIGHT: 61 IN

## 2024-01-03 DIAGNOSIS — Z12.31 VISIT FOR SCREENING MAMMOGRAM: ICD-10-CM

## 2024-01-03 PROCEDURE — 77063 BREAST TOMOSYNTHESIS BI: CPT

## 2024-06-13 ENCOUNTER — OFFICE VISIT (OUTPATIENT)
Dept: ORTHOPEDIC SURGERY | Age: 74
End: 2024-06-13
Payer: MEDICARE

## 2024-06-13 VITALS — BODY MASS INDEX: 32.1 KG/M2 | HEIGHT: 61 IN | WEIGHT: 170 LBS

## 2024-06-13 DIAGNOSIS — M47.816 LUMBAR SPONDYLOSIS: Primary | ICD-10-CM

## 2024-06-13 PROCEDURE — 1123F ACP DISCUSS/DSCN MKR DOCD: CPT | Performed by: ORTHOPAEDIC SURGERY

## 2024-06-13 PROCEDURE — 99214 OFFICE O/P EST MOD 30 MIN: CPT | Performed by: ORTHOPAEDIC SURGERY

## 2024-06-13 RX ORDER — GABAPENTIN 300 MG/1
300 CAPSULE ORAL 4 TIMES DAILY
Qty: 120 CAPSULE | Refills: 2 | Status: SHIPPED | OUTPATIENT
Start: 2024-06-13 | End: 2024-09-11

## 2024-06-13 RX ORDER — CYCLOBENZAPRINE HCL 5 MG
1 TABLET ORAL EVERY 8 HOURS PRN
COMMUNITY
Start: 2021-04-06

## 2024-06-13 RX ORDER — AMLODIPINE BESYLATE 5 MG/1
5 TABLET ORAL DAILY
COMMUNITY

## 2024-06-13 NOTE — PROGRESS NOTES
doses., Disp: 30 capsule, Rfl: 0    docusate sodium (COLACE) 100 MG capsule, Take 1 capsule by mouth 2 times daily as needed for Constipation, Disp: 30 capsule, Rfl: 1  Allergies:  Patient has no known allergies.  Social History:    reports that she has never smoked. She has never used smokeless tobacco. She reports that she does not drink alcohol and does not use drugs.  Family History:   Family History   Problem Relation Age of Onset    Diabetes Father     Heart Disease Father     Cancer Brother        REVIEW OF SYSTEMS: Full ROS noted & scanned   CONSTITUTIONAL: Denies unexplained weight loss, fevers, chills or fatigue  NEUROLOGICAL: Denies unsteady gait or progressive weakness  MUSCULOSKELETAL: Denies joint swelling or redness  PSYCHOLOGICAL: Denies anxiety, depression   SKIN: Denies skin changes, delayed healing, rash, itching   HEMATOLOGIC: Denies easy bleeding or bruising  ENDOCRINE: Denies excessive thirst, urination, heat/cold  RESPIRATORY: Denies current dyspnea, cough  GI: Denies nausea, vomiting, diarrhea   : Denies bowel or bladder issues      PHYSICAL EXAM:    Vitals: Height 1.549 m (5' 1\"), weight 77.1 kg (170 lb).    GENERAL EXAM:  General Apparence: Patient is adequately groomed with no evidence of malnutrition.  Orientation: The patient is oriented to time, place and person.   Mood & Affect:The patient's mood and affect are appropriate.  Vascular: Examination reveals no swelling tenderness in upper or lower extremities. Good capillary refill.  Lymphatic: The lymphatic examination bilaterally reveals all areas to be without enlargement or induration  Sensation: Sensation is intact without deficit  Coordination/Balance: Good coordination     LUMBAR/SACRAL EXAMINATION:  Inspection: Local inspection shows no step-off or bruising.  Lumbar alignment is normal.  Sagittal and Coronal balance is neutral.      Palpation:   No evidence of tenderness at the midline.  No tenderness bilaterally at the

## 2024-06-28 ENCOUNTER — HOSPITAL ENCOUNTER (OUTPATIENT)
Dept: PHYSICAL THERAPY | Age: 74
Setting detail: THERAPIES SERIES
Discharge: HOME | End: 2024-06-28
Payer: MEDICARE

## 2024-06-28 PROCEDURE — 97110 THERAPEUTIC EXERCISES: CPT

## 2024-06-28 PROCEDURE — 97161 PT EVAL LOW COMPLEX 20 MIN: CPT

## 2024-06-28 NOTE — PLAN OF CARE
progression   [] Goals require adjustment due to lack of progress  [] Patient is not progressing as expected and requires additional follow up with physician  [] Other:     TREATMENT PLAN     Frequency/Duration: 2x/week for 4 weeks for the following treatment interventions:    Interventions:  Therapeutic Exercise (89981) including: strength training, ROM, and functional mobility  Therapeutic Activities (91915) including: functional mobility training and education.  Neuromuscular Re-education (00500) activation and proprioception, including postural re-education.    Manual Therapy (43683) as indicated to include: Passive Range of Motion, Gr I-IV mobilizations, and Grade V Manipulation  Modalities as needed that may include: Thermal Agents  Patient education on joint protection, postural re-education, and progression of HEP    Plan: POC initiated as per evaluation    Electronically Signed by Joseph Piedra PT  Date: 06/28/2024     Note: Portions of this note have been templated and/or copied from initial evaluation, reassessments and prior notes for documentation efficiency.    Note: If patient does not return for scheduled/recommended follow up visits, this note will serve as a discharge from care along with the most recent update on progress.

## 2024-07-02 ENCOUNTER — HOSPITAL ENCOUNTER (OUTPATIENT)
Dept: PHYSICAL THERAPY | Age: 74
Setting detail: THERAPIES SERIES
Discharge: HOME | End: 2024-07-02
Payer: MEDICARE

## 2024-07-02 PROCEDURE — 97110 THERAPEUTIC EXERCISES: CPT

## 2024-07-02 PROCEDURE — 97140 MANUAL THERAPY 1/> REGIONS: CPT

## 2024-07-02 NOTE — FLOWSHEET NOTE
[facility]- Outpatient Rehabilitation and Therapy  [address] [place] [address] office: [phone] fax: [phone]          Physical Therapy: TREATMENT/PROGRESS NOTE   Patient: Fabiana Mckeon (74 y.o. female)   Examination Date: 2024   :  1950 MRN: [id number]   Visit #: 2   Insurance Allowable Auth Needed   BMN []Yes    [x]No    Insurance: Payor: AETNA MEDICARE / Plan: AETNA MEDICARE-ADVANTAGE PPO / Product Type: Medicare /   Insurance ID: [id number] - (Medicare Managed)  Secondary Insurance (if applicable):    Treatment Diagnosis: R SIJ dysfunction, lumbar hypomobility     Medical Diagnosis:  OA (osteoarthritis of spine) [M47.9]   Referring Physician: Marcos Ortiz MD  PCP: Sharonda Muniz MD     Plan of care signed (Y/N): yes    Date of Patient follow up with Physician: sees PCP in September     Progress Report/POC: NO  POC update due: (10 visits /OR AUTH LIMITS, whichever is less) 24                                            Precautions/ Contra-indications:           Latex allergy:  NO  Pacemaker:    NO  Contraindications for Manipulation: None  Date of Surgery: 22  Other:    Red Flags:  None    C-SSRS Triggered by Intake questionnaire:   Patient answered 'NO' to both behavioral questions on intake.  No further screening warranted    Preferred Language for Healthcare:   [x] English       [] other:    SUBJECTIVE EXAMINATION     Patient stated complaint: started her HEP yesterday.  She isn't sure if she's doing the quadruped multifidus lift correctly.       Test used Initial score  2024   Pain Summary VAS 3-8/10 610   Functional questionnaire Modified Oswestry 34% disability    Other:                OBJECTIVE EXAMINATION     Objective:       Exercises/Interventions     Therapeutic Ex (00869)  resistance Sets/time Reps Notes/Cues/Progressions   Quadruped multifidus hip lift   10 B To review for HEP    DKTC with SB   30    LTR

## 2024-07-11 ENCOUNTER — HOSPITAL ENCOUNTER (OUTPATIENT)
Dept: PHYSICAL THERAPY | Age: 74
Setting detail: THERAPIES SERIES
Discharge: HOME | End: 2024-07-11
Payer: MEDICARE

## 2024-07-11 PROCEDURE — 97110 THERAPEUTIC EXERCISES: CPT

## 2024-07-11 PROCEDURE — 97140 MANUAL THERAPY 1/> REGIONS: CPT

## 2024-07-22 ENCOUNTER — HOSPITAL ENCOUNTER (OUTPATIENT)
Dept: PHYSICAL THERAPY | Age: 74
Setting detail: THERAPIES SERIES
Discharge: HOME | End: 2024-07-22
Payer: MEDICARE

## 2024-07-22 PROCEDURE — 97110 THERAPEUTIC EXERCISES: CPT

## 2024-07-22 PROCEDURE — 97140 MANUAL THERAPY 1/> REGIONS: CPT

## 2024-07-22 NOTE — FLOWSHEET NOTE
[facility]- Outpatient Rehabilitation and Therapy  [address] [place] [address] office: [phone] fax: [phone]          Physical Therapy: TREATMENT/PROGRESS NOTE   Patient: Fabiana Mckeon (74 y.o. female)   Examination Date: 2024   :  1950 MRN: [id number]   Visit #:   Insurance Allowable Auth Needed   BMN []Yes    [x]No    Insurance: Payor: AETNA MEDICARE / Plan: AETNA MEDICARE-ADVANTAGE PPO / Product Type: Medicare /   Insurance ID: [id number] - (Medicare Managed)  Secondary Insurance (if applicable):    Treatment Diagnosis: R SIJ dysfunction, lumbar hypomobility     Medical Diagnosis:  OA (osteoarthritis of spine) [M47.9]   Referring Physician: Marcos Ortiz MD  PCP: Sharonda Muniz MD     Plan of care signed (Y/N): yes    Date of Patient follow up with Physician: sees PCP in September     Progress Report/POC: NO  POC update due: (10 visits /OR AUTH LIMITS, whichever is less) 24                                            Precautions/ Contra-indications:           Latex allergy:  NO  Pacemaker:    NO  Contraindications for Manipulation: None  Date of Surgery: 22  Other:    Red Flags:  None    C-SSRS Triggered by Intake questionnaire:   Patient answered 'NO' to both behavioral questions on intake.  No further screening warranted    Preferred Language for Healthcare:   [x] English       [] other:    SUBJECTIVE EXAMINATION     Patient stated complaint: states she has some tightness in the morning but this is improving.         Test used Initial score  2024   Pain Summary VAS 3-8/10 1-2/10   Functional questionnaire Modified Oswestry 34% disability    Other:                OBJECTIVE EXAMINATION     Objective: laila new ex well      Exercises/Interventions     Therapeutic Ex (41111)  resistance Sets/time Reps Notes/Cues/Progressions   Quadruped multifidus hip lift   To review for HEP    DKTC with SB   30    LTR with SB   10 B

## 2024-09-30 ENCOUNTER — HOSPITAL ENCOUNTER (OUTPATIENT)
Dept: PHYSICAL THERAPY | Age: 74
Setting detail: THERAPIES SERIES
Discharge: HOME | End: 2024-09-30
Payer: MEDICARE

## 2024-09-30 PROCEDURE — 97162 PT EVAL MOD COMPLEX 30 MIN: CPT

## 2024-09-30 NOTE — THERAPY EVALUATION
tension test []Neg   []Pos R   []Pos L   []NT     Sensation/Motor Function   [x] All dermatomes WNL except as marked below   [x] All  myotomes WNL except as marked below      Dermatome Left Right   Anterior groin, 2-3 inches below ASIS (L1-L2)     Middle third anterior thigh (L3)     Patella and med malleolus (L4)     Fibular head and dorsum of foot (L5)     Lateral side and plantar surface of foot (S1)     Medial aspect of posterior thigh (S2)     Perianal area (S3,4)       Range of Motion/Strength Testing     [x] All ROM and strength WNL except as marked below   * Denotes limitation by pain    Range Tested AROM PROM MMT/Resisted    Left Right Left Right Left Right   Hip Flexion  100       Hip Extension         Hip Abduction         Hip Adduction         Hip IR     4- 4-*   Hip ER     4- 4-*   Knee Flexion     5 4-*   Knee Extension     5 4-*   Ankle Dorsiflex      4   Ankle Plantarflex         Ankle Inversion         Ankle Eversion           Flexibility     [x] All flexibility WNL except as marked below  Muscle Left Right   Hamstrings (90/90) []  WNL  [] Tight  [] NT []  WNL  [x] Tight  [] NT   Gastroc []  WNL  [] Tight  [] NT []  WNL  [] Tight  [] NT   TFL/ITB (Phuong’s) []  WNL  [] Tight  [] NT []  WNL  [] Tight  [] NT   Iliopsoas (Jason) []  WNL  [] Tight  [] NT []  WNL  [] Tight  [] NT   Piriformis []  WNL  [] Tight  [] NT []  WNL  [] Tight  [] NT     Joint Mobility  Lumbar: Hypomobility lumbar spine  Hip: Hypomobility R hip    Movement Impairment   AGMR Hip       [] R    [] L   [x] Bilateral  ERS Lumbar    []  Pos  []  Neg     Reflexes/Trunk Strength    [x] All WNL except as marked below     Reflex Left Right Strength Strength   Quadriceps (L3,4)   Transv Abdominis    Achilles (S1,2)       Ankle clonus       Babinski         Exercises/Interventions     Therapeutic Ex (71879)  resistance Sets/time Reps Notes/Cues/Progressions          TA sets  NV     3 way ball compression  NV

## 2024-10-03 ENCOUNTER — HOSPITAL ENCOUNTER (OUTPATIENT)
Dept: PHYSICAL THERAPY | Age: 74
Setting detail: THERAPIES SERIES
Discharge: HOME | End: 2024-10-03
Payer: MEDICARE

## 2024-10-03 PROCEDURE — 97140 MANUAL THERAPY 1/> REGIONS: CPT

## 2024-10-03 NOTE — FLOWSHEET NOTE
[facility]- Outpatient Rehabilitation and Therapy  [address] [place] [address] office: [phone] fax: [phone]     Physical Therapy Daily Note      Dear Marcos Ortiz MD,    We had the pleasure of evaluating the following patient for physical therapy services at [facility] Outpatient Physical Therapy.  A summary of our findings can be found in the initial assessment below.  This includes our plan of care.  If you have any questions or concerns regarding these findings, please do not hesitate to contact me at the office phone number listed above.  Thank you for the referral.     Physician Signature:_______________________________Date:__________________  By signing above (or electronic signature), therapist’s plan is approved by physician       Physical Therapy: TREATMENT/PROGRESS NOTE   Patient: Fabiana Mckeon (74 y.o. female)   Examination Date: 10/03/2024   :  1950 MRN: [id number]   Visit #: 2   Insurance Allowable Auth Needed    [x]Yes    []No    Insurance: Payor: AETNA MEDICARE / Plan: AETNA MEDICARE-ADVANTAGE PPO / Product Type: Medicare /   Insurance ID: [id number] - (Medicare Managed)  Secondary Insurance (if applicable):    Treatment Diagnosis:   No diagnosis found.   Medical Diagnosis:  Lumbar spondylosis [M47.816]   Referring Physician: Marcos Ortiz MD  PCP: Sharonda Muniz MD     Plan of care signed (Y/N):     Date of Patient follow up with Physician:      Plan of Care Report: EVAL today  POC update due: (10 visits /OR AUTH LIMITS, whichever is less)  2024                                             Medical History:  Comorbidities:  Diabetes (Type I or II)  Hypertension  Relevant Medical History: See medical chart                                         Precautions/ Contra-indications:           Latex allergy:  NO  Pacemaker:    NO  Contraindications for Manipulation: None  Other:    Red Flags:  None    Suicide Screening:   The patient

## 2024-10-07 ENCOUNTER — HOSPITAL ENCOUNTER (OUTPATIENT)
Dept: PHYSICAL THERAPY | Age: 74
Setting detail: THERAPIES SERIES
Discharge: HOME | End: 2024-10-07
Payer: MEDICARE

## 2024-10-07 PROCEDURE — 97110 THERAPEUTIC EXERCISES: CPT | Performed by: PHYSICAL THERAPIST

## 2024-10-07 PROCEDURE — 97140 MANUAL THERAPY 1/> REGIONS: CPT | Performed by: PHYSICAL THERAPIST

## 2024-10-07 NOTE — FLOWSHEET NOTE
tolerance to today's session, reporting difficulty with transitioning from each treatment position with program completed. Unable to progress  exercise difficulty on TE. Continues to display deficits in tightness and and symptom relief which required ongoing skilled physical therapy and decision making.     Medical Necessity Documentation:  I certify that this patient meets the below criteria necessary for medical necessity for care and/or justification of therapy services:  The patient has functional impairments and/or activity limitations and would benefit from continued outpatient therapy services to address the deficits outlined in the patients goals    Prognosis for POC: [] Good [x] Fair  [] Poor    Patient requires continued skilled intervention: [x] Yes  [] No      CHARGE CAPTURE     PT CHARGE GRID   CPT Code (TIMED) minutes # CPT Code (UNTIMED) #     Therex (65282)  15 1  EVAL:MODERATE (57254 - Typically 30 minutes face-to-face)     Neuromusc. Re-ed (88961) 5   Re-Eval (09777)     Manual (60514) 20 2  Estim Unattended (54343)     Ther. Act (32782)    Mech. Traction (60904)     Gait (52745)    Dry Needle 1-2 muscle (20560)     Aquatic Therex (40240)    Dry Needle 3+ muscle (20561)     Iontophoresis (50623)    VASO (97268)     Ultrasound (17419)    Group Therapy (51098)     Estim Attended (89298)    Canalith Repositioning (58232)     Physical Performance Test (87518)         Other:    Other:    Total Timed Code Tx Minutes 40 3       Total Treatment Minutes 40        Charge Justification:  (50578) THERAPEUTIC EXERCISE - Provided verbal/tactile cueing for activities related to strengthening, flexibility, endurance, ROM performed to prevent loss of range of motion, maintain or improve muscular strength or increase flexibility, following either an injury or surgery.   (34698) MANUAL THERAPY -  Manual therapy techniques, 1 or more regions, each 15 minutes (Mobilization/manipulation, manual lymphatic drainage,

## 2024-10-09 ENCOUNTER — APPOINTMENT (OUTPATIENT)
Dept: PHYSICAL THERAPY | Age: 74
End: 2024-10-09
Payer: MEDICARE

## 2024-10-16 ENCOUNTER — TELEPHONE (OUTPATIENT)
Dept: ORTHOPEDIC SURGERY | Age: 74
End: 2024-10-16

## 2024-10-16 ENCOUNTER — OFFICE VISIT (OUTPATIENT)
Dept: ORTHOPEDIC SURGERY | Age: 74
End: 2024-10-16
Payer: MEDICARE

## 2024-10-16 VITALS — WEIGHT: 169.97 LBS | HEIGHT: 61 IN | BODY MASS INDEX: 32.09 KG/M2

## 2024-10-16 DIAGNOSIS — M48.062 LUMBAR STENOSIS WITH NEUROGENIC CLAUDICATION: Primary | ICD-10-CM

## 2024-10-16 PROCEDURE — 99214 OFFICE O/P EST MOD 30 MIN: CPT | Performed by: ORTHOPAEDIC SURGERY

## 2024-10-16 PROCEDURE — 1123F ACP DISCUSS/DSCN MKR DOCD: CPT | Performed by: ORTHOPAEDIC SURGERY

## 2024-10-16 RX ORDER — METHYLPREDNISOLONE 4 MG
TABLET, DOSE PACK ORAL
Qty: 1 KIT | Refills: 0 | Status: SHIPPED | OUTPATIENT
Start: 2024-10-16 | End: 2024-10-22

## 2024-10-16 NOTE — PROGRESS NOTES
rotation are mildly limited due to pain.  Strength:   Strength testing is 5/5 in all muscle groups tested.   Special Tests:   Straight leg raise and crossed SLR negative.  Leg length and pelvis level.     Skin: There are no rashes, ulcerations or lesions.  Reflexes: Reflexes are symmetrically 2+ at the patellar and ankle tendons.  Clonus absent bilaterally at the feet.  Gait & station: normal, patient ambulates without assistance    Additional Examinations:   RIGHT LOWER EXTREMITY: Inspection/examination of the right lower extremity does not show any tenderness, deformity or injury. Range of motion is unremarkable. There is no gross instability.  There are no rashes, ulcerations or lesions. Strength and tone are normal.    LEFT LOWER EXTREMITY:  Inspection/examination of the left lower extremity does not show any tenderness, deformity or injury. Range of motion is unremarkable. There is no gross instability. There are no rashes, ulcerations or lesions.  Strength and tone are normal.    Diagnostic Testing:    I reviewed AP and lateral x-rays lumbar spine obtained in the office 6/13/24.  Those show spondylosis    I reviewed AP and lateral x-rays lumbar spine from 10/28/2021 in the office today.  Those show spondylosis    I reviewed MRI images of the lumbar spine from 8/8/2022 in the office today.  Those show severe central stenosis L4-L5    Reviewed MRI images of her lumbar spine from 12/27/2022 in the office today post laminotomy changes left L4-L5 and a central disc protrusion at L5-S1    Impression:   Lumbar degenerative disc disease    Plan:    Discussed treatment options including observation, physical therapy, oral steroids, epidural injection, and additional imaging.  We discussed the risk of benefits of oral steroids including risk of GI bleed a vast necrosis and that will elevate her blood glucose.  She will discuss those with Dr. Muniz and take a course of oral steroids if she is able to do so.  We will

## 2024-10-16 NOTE — TELEPHONE ENCOUNTER
Appointment Request     Patient requesting earlier appointment: Yes  Appointment offered to patient: 10/30/24@ [facility]   Patient Contact Number: [phone]     Patient is calling to see if she can be seen sooner then  the date above she is having her Mri on 10/17/24 and she would only like the [facility] Location Only. Please Advise.

## 2024-10-16 NOTE — TELEPHONE ENCOUNTER
[facility] Clinical Staff  Approval for MRI: of Lumbar  Approval Letter in Media  Approved Facility [facility]  Approval Dates: 10.16.24 to 04.14.25  Auth #: [id number]    Left VM for patient advising that the MRI was approved and they can call to schedule. Advised that patient should make a follow-up with Dr. Ortiz 2-3 days after the MRI for review.

## 2024-10-23 ENCOUNTER — APPOINTMENT (OUTPATIENT)
Dept: PHYSICAL THERAPY | Age: 74
End: 2024-10-23
Payer: MEDICARE

## 2024-10-30 ENCOUNTER — OFFICE VISIT (OUTPATIENT)
Dept: ORTHOPEDIC SURGERY | Age: 74
End: 2024-10-30
Payer: MEDICARE

## 2024-10-30 ENCOUNTER — APPOINTMENT (OUTPATIENT)
Dept: PHYSICAL THERAPY | Age: 74
End: 2024-10-30
Payer: MEDICARE

## 2024-10-30 VITALS — HEIGHT: 61 IN | WEIGHT: 169.97 LBS | BODY MASS INDEX: 32.09 KG/M2

## 2024-10-30 DIAGNOSIS — M51.26 RECURRENT HERNIATION OF LUMBAR DISC: Primary | ICD-10-CM

## 2024-10-30 PROCEDURE — 99214 OFFICE O/P EST MOD 30 MIN: CPT | Performed by: ORTHOPAEDIC SURGERY

## 2024-10-30 PROCEDURE — 1123F ACP DISCUSS/DSCN MKR DOCD: CPT | Performed by: ORTHOPAEDIC SURGERY

## 2024-10-30 PROCEDURE — 1159F MED LIST DOCD IN RCRD: CPT | Performed by: ORTHOPAEDIC SURGERY

## 2024-10-30 NOTE — PROGRESS NOTES
daily. Indications: unsure of dosage, Disp: , Rfl:   Allergies:  Patient has no known allergies.  Social History:    reports that she has never smoked. She has never used smokeless tobacco. She reports that she does not drink alcohol and does not use drugs.  Family History:   Family History   Problem Relation Age of Onset    Diabetes Father     Heart Disease Father     Cancer Brother        REVIEW OF SYSTEMS: Full ROS noted & scanned   CONSTITUTIONAL: Denies unexplained weight loss, fevers, chills or fatigue  NEUROLOGICAL: Denies unsteady gait or progressive weakness  MUSCULOSKELETAL: Denies joint swelling or redness  PSYCHOLOGICAL: Denies anxiety, depression   SKIN: Denies skin changes, delayed healing, rash, itching   HEMATOLOGIC: Denies easy bleeding or bruising  ENDOCRINE: Denies excessive thirst, urination, heat/cold  RESPIRATORY: Denies current dyspnea, cough  GI: Denies nausea, vomiting, diarrhea   : Denies bowel or bladder issues      PHYSICAL EXAM:    Vitals: Height 1.549 m (5' 1\"), weight 77.1 kg (169 lb 15.6 oz).    GENERAL EXAM:  General Apparence: Patient is adequately groomed with no evidence of malnutrition.  Orientation: The patient is oriented to time, place and person.   Mood & Affect:The patient's mood and affect are appropriate.  Vascular: Examination reveals no swelling tenderness in upper or lower extremities. Good capillary refill.  Lymphatic: The lymphatic examination bilaterally reveals all areas to be without enlargement or induration  Sensation: Sensation is intact without deficit  Coordination/Balance: Good coordination     LUMBAR/SACRAL EXAMINATION:  Inspection: Local inspection shows no step-off or bruising.  Lumbar alignment is normal.  Sagittal and Coronal balance is neutral.      Palpation:   No evidence of tenderness at the midline.  No tenderness bilaterally at the paraspinal or trochanters.  There is no step-off or paraspinal spasm.   Range of Motion: Lumbar flexion, extension

## 2024-11-13 DIAGNOSIS — M48.062 LUMBAR STENOSIS WITH NEUROGENIC CLAUDICATION: Primary | ICD-10-CM

## 2024-11-13 RX ORDER — TRAMADOL HYDROCHLORIDE 50 MG/1
50 TABLET ORAL 2 TIMES DAILY PRN
Qty: 30 TABLET | Refills: 0 | Status: SHIPPED | OUTPATIENT
Start: 2024-11-13 | End: 2024-12-14

## 2024-11-20 ENCOUNTER — TELEPHONE (OUTPATIENT)
Dept: ORTHOPEDIC SURGERY | Age: 74
End: 2024-11-20

## 2024-11-20 NOTE — TELEPHONE ENCOUNTER
Surgery and/or Procedure Scheduling     Contact Name: Fabiana Mckeon   Surgical/Procedure Request: LUMBAR   Patient Contact Number: [phone]     PATIENT CALLING TO SEE IF SURGERY WAS JUAN PABLO FOR 12/20/24    PLEASE CALL PATIENT AT THE ABOVE NUMBER

## 2024-11-20 NOTE — TELEPHONE ENCOUNTER
Called and left a voicemail for patient stating that we are still waiting for insurance approval however I have her down for 12/20. She will see it on her MyChart Once insurance approves. Left my number incase she has anymore questions.

## 2024-12-04 ENCOUNTER — TELEPHONE (OUTPATIENT)
Dept: ORTHOPEDIC SURGERY | Age: 74
End: 2024-12-04

## 2024-12-04 NOTE — TELEPHONE ENCOUNTER
Spoke to patient, Let her know that I still haven't heard back from insurance about her surgery, however it can take up to 21 days to get an approval. Advised if she has anymore questions/concerns to please give me a call back.

## 2024-12-06 ENCOUNTER — PREP FOR PROCEDURE (OUTPATIENT)
Dept: ORTHOPEDIC SURGERY | Age: 74
End: 2024-12-06

## 2024-12-06 DIAGNOSIS — M51.26 RECURRENT HERNIATION OF LUMBAR DISC: Primary | ICD-10-CM

## 2024-12-06 RX ORDER — SODIUM CHLORIDE 9 MG/ML
INJECTION, SOLUTION INTRAVENOUS PRN
Status: CANCELLED | OUTPATIENT
Start: 2024-12-06

## 2024-12-06 RX ORDER — SODIUM CHLORIDE 0.9 % (FLUSH) 0.9 %
5-40 SYRINGE (ML) INJECTION PRN
Status: CANCELLED | OUTPATIENT
Start: 2024-12-06

## 2024-12-06 RX ORDER — SODIUM CHLORIDE 0.9 % (FLUSH) 0.9 %
5-40 SYRINGE (ML) INJECTION EVERY 12 HOURS SCHEDULED
Status: CANCELLED | OUTPATIENT
Start: 2024-12-06

## 2024-12-06 NOTE — PROGRESS NOTES
Fabiana Mckeon    Age 74 y.o.    female    1950    MRN [id number]    12/20/2024  Arrival Time_____________  OR Time____________145 Min     Procedure(s):  MICROLUMBAR LAMINECTOMY DISCECTOMY RIGHT LUMBAR FOUR TO LUMBAR FIVE WITH EXCISION OF RECURRENT HERNIATED NUCLEUS PULPOSUS                      General   Surgeon(s):  Marcos Ortiz, MD      DAY ADMIT ___  SDS/OP ___  OUTPT IN BED ___        Phone [phone] (home) [phone] (work)                 PCP _____________________ Phone_________________ Epic ( ) Epic CE ( ) Appt ________    NOTES: _________________________________________ Consult/Cardio _______________    ____________________________________________________________________________    ____________________________________________________________________________  PAT APPT DATE:________ TIME: ________  FAXED QAD: _______  (__) H&P w/ Hospitalist    (__) PAT orders in EPIC    (__) Meet with PAT nurse  __________________________________________________________________________  Preop Nurse phone screen complete: _____________  (__) CBC     (__) W/ DIFF ___________  (__) CT CHEST  __________   (__) Hgb A1C    ___________  (__) CHEST X RAY   __________  (__) LIPID PROFILE  ___________  (__) EKG   __________  (__) PT-INR / APTT  ___________  (__) PFT's   __________  (__) BMP   ___________  (__) CAROTIDS  __________  (__) CMP   ___________  (__) VEIN MAPPING  __________  (__) U/A   ___________  ( X ) HISTORY & PHYSICAL __________  (__) URINE C & S  ___________  (__) CARDIAC CLEARANCE __________  (__) U/A W/ FLEX  ___________  (__) PULM. CLEARANCE __________  (__) SERUM PREGNANCY ___________  (__) Preop Orders in EPIC __________  (__) TYPE & SCREEN __________repeat ( ) (__)  __________________ __________  (__) Albumin   ___________  (__)  __________________ __________  (__) TRANSFERRIN  ___________  (__)  __________________ __________  (__) LIVER PROFILE  ___________  (__) URINE PREG

## 2024-12-09 ENCOUNTER — TELEPHONE (OUTPATIENT)
Dept: ORTHOPEDIC SURGERY | Age: 74
End: 2024-12-09

## 2024-12-09 ENCOUNTER — HOSPITAL ENCOUNTER (OUTPATIENT)
Age: 74
Discharge: HOME | End: 2024-12-13
Payer: MEDICARE

## 2024-12-09 LAB
ANION GAP SERPL CALCULATED.3IONS-SCNC: 10 MMOL/L (ref 3–16)
BUN SERPL-MCNC: 18 MG/DL (ref 7–20)
CALCIUM SERPL-MCNC: 9.5 MG/DL (ref 8.3–10.6)
CHLORIDE SERPL-SCNC: 105 MMOL/L (ref 99–110)
CO2 SERPL-SCNC: 26 MMOL/L (ref 21–32)
CREAT SERPL-MCNC: 1 MG/DL (ref 0.6–1.2)
EKG ATRIAL RATE: 72 BPM
EKG DIAGNOSIS: NORMAL
EKG P AXIS: 51 DEGREES
EKG P-R INTERVAL: 124 MS
EKG Q-T INTERVAL: 406 MS
EKG QRS DURATION: 82 MS
EKG QTC CALCULATION (BAZETT): 444 MS
EKG R AXIS: 42 DEGREES
EKG T AXIS: 59 DEGREES
EKG VENTRICULAR RATE: 72 BPM
EST. AVERAGE GLUCOSE BLD GHB EST-MCNC: 162.8 MG/DL
GFR SERPLBLD CREATININE-BSD FMLA CKD-EPI: 60 ML/MIN/{1.73_M2}
GLUCOSE SERPL-MCNC: 125 MG/DL (ref 70–99)
HBA1C MFR BLD: 7.3 %
POTASSIUM SERPL-SCNC: 4 MMOL/L (ref 3.5–5.1)
SODIUM SERPL-SCNC: 141 MMOL/L (ref 136–145)

## 2024-12-09 PROCEDURE — 93005 ELECTROCARDIOGRAM TRACING: CPT | Performed by: INTERNAL MEDICINE

## 2024-12-09 PROCEDURE — 83036 HEMOGLOBIN GLYCOSYLATED A1C: CPT

## 2024-12-09 PROCEDURE — 80048 BASIC METABOLIC PNL TOTAL CA: CPT

## 2024-12-09 PROCEDURE — 93010 ELECTROCARDIOGRAM REPORT: CPT | Performed by: INTERNAL MEDICINE

## 2024-12-09 PROCEDURE — 36415 COLL VENOUS BLD VENIPUNCTURE: CPT

## 2024-12-11 ENCOUNTER — TELEPHONE (OUTPATIENT)
Dept: ORTHOPEDIC SURGERY | Age: 74
End: 2024-12-11

## 2024-12-14 ENCOUNTER — ANESTHESIA EVENT (OUTPATIENT)
Dept: OPERATING ROOM | Age: 74
End: 2024-12-14
Payer: MEDICARE

## 2024-12-16 RX ORDER — PIOGLITAZONE 30 MG/1
30 TABLET ORAL NIGHTLY
COMMUNITY

## 2024-12-16 NOTE — PROGRESS NOTES
Surgery Date and Time:   12/20/24  @  9:45 am       Arrival Time:   7:45 am        The instructions given when and if a patient needs to stop oral intake prior to surgery varies. Follow the instructions you were      given by your Surgeon or RN during the Pre-op call.      __X__Do not eat or drink anything after Midnight the night before the surgery. NO gum, mints, candy or ice chips the day of surgery.         Only take the following medications with a small sip of water the morning of surgery:    NONE       Hold Januvia 24 hrs prior - last dose 12/18       Hold the following medications (per anesthesia or surgeon request):  Lisinopril - hold 24 hrs prior    Last Dose:  12/19 6 am        Aspirin, Ibuprofen, Advil, Naproxen, Vitamin E and other Anti-inflammatory products and supplements should be stopped       for 5 -7days before surgery or as directed by your physician.     - Do not smoke or vape, and do not drink any alcoholic beverages 24 hours prior to surgery, this includes NA Beer. Refrain from using     any recreational drugs, including non-prescribed prescription drugs.     -You may brush your teeth and gargle the morning of surgery.  DO NOT SWALLOW WATER.    -You MUST plan for a responsible adult to stay on site while you are here and take you home after your surgery. You will not be allowed                to leave alone or drive yourself home. It is requested someone stay with you the first 24 hrs. Your surgery will be cancelled if you do not                have a ride home with a responsible adult.    -Please wear simple, loose-fitting clothing to the hospital. Do not bring valuables (money, credit cards, checkbooks, etc.)                Do not wear any makeup (including no eye makeup) and no nail polish if applicable.             - DO NOT wear any jewelry or body piercings day of surgery.  All body piercing jewelry must be removed.             - If you have dentures they will be removed before

## 2024-12-18 ENCOUNTER — TELEPHONE (OUTPATIENT)
Dept: ORTHOPEDIC SURGERY | Age: 74
End: 2024-12-18

## 2024-12-20 ENCOUNTER — ANESTHESIA (OUTPATIENT)
Dept: OPERATING ROOM | Age: 74
End: 2024-12-20
Payer: MEDICARE

## 2024-12-20 ENCOUNTER — HOSPITAL ENCOUNTER (OUTPATIENT)
Age: 74
Setting detail: OUTPATIENT SURGERY
Discharge: HOME | End: 2024-12-20
Attending: ORTHOPAEDIC SURGERY | Admitting: ORTHOPAEDIC SURGERY
Payer: MEDICARE

## 2024-12-20 ENCOUNTER — APPOINTMENT (OUTPATIENT)
Dept: GENERAL RADIOLOGY | Age: 74
End: 2024-12-20
Attending: ORTHOPAEDIC SURGERY
Payer: MEDICARE

## 2024-12-20 VITALS
WEIGHT: 179 LBS | HEIGHT: 61 IN | SYSTOLIC BLOOD PRESSURE: 131 MMHG | BODY MASS INDEX: 33.79 KG/M2 | OXYGEN SATURATION: 89 % | HEART RATE: 71 BPM | DIASTOLIC BLOOD PRESSURE: 56 MMHG | TEMPERATURE: 96.8 F | RESPIRATION RATE: 18 BRPM

## 2024-12-20 DIAGNOSIS — M51.26 RECURRENT HERNIATION OF LUMBAR DISC: Primary | ICD-10-CM

## 2024-12-20 LAB
ABO + RH BLD: NORMAL
BLD GP AB SCN SERPL QL: NORMAL
GLUCOSE BLD-MCNC: 142 MG/DL (ref 70–99)
PERFORMED ON: ABNORMAL

## 2024-12-20 PROCEDURE — 7100000010 HC PHASE II RECOVERY - FIRST 15 MIN: Performed by: ORTHOPAEDIC SURGERY

## 2024-12-20 PROCEDURE — 3600000014 HC SURGERY LEVEL 4 ADDTL 15MIN: Performed by: ORTHOPAEDIC SURGERY

## 2024-12-20 PROCEDURE — 86900 BLOOD TYPING SEROLOGIC ABO: CPT

## 2024-12-20 PROCEDURE — 7100000000 HC PACU RECOVERY - FIRST 15 MIN: Performed by: ORTHOPAEDIC SURGERY

## 2024-12-20 PROCEDURE — 2580000003 HC RX 258: Performed by: NURSE ANESTHETIST, CERTIFIED REGISTERED

## 2024-12-20 PROCEDURE — 6370000000 HC RX 637 (ALT 250 FOR IP): Performed by: ANESTHESIOLOGY

## 2024-12-20 PROCEDURE — 86850 RBC ANTIBODY SCREEN: CPT

## 2024-12-20 PROCEDURE — 3700000000 HC ANESTHESIA ATTENDED CARE: Performed by: ORTHOPAEDIC SURGERY

## 2024-12-20 PROCEDURE — 7100000001 HC PACU RECOVERY - ADDTL 15 MIN: Performed by: ORTHOPAEDIC SURGERY

## 2024-12-20 PROCEDURE — 2580000003 HC RX 258: Performed by: ANESTHESIOLOGY

## 2024-12-20 PROCEDURE — 86901 BLOOD TYPING SEROLOGIC RH(D): CPT

## 2024-12-20 PROCEDURE — 6360000002 HC RX W HCPCS: Performed by: NURSE ANESTHETIST, CERTIFIED REGISTERED

## 2024-12-20 PROCEDURE — 6370000000 HC RX 637 (ALT 250 FOR IP): Performed by: STUDENT IN AN ORGANIZED HEALTH CARE EDUCATION/TRAINING PROGRAM

## 2024-12-20 PROCEDURE — 2500000003 HC RX 250 WO HCPCS: Performed by: NURSE ANESTHETIST, CERTIFIED REGISTERED

## 2024-12-20 PROCEDURE — 6360000002 HC RX W HCPCS: Performed by: ORTHOPAEDIC SURGERY

## 2024-12-20 PROCEDURE — 3700000001 HC ADD 15 MINUTES (ANESTHESIA): Performed by: ORTHOPAEDIC SURGERY

## 2024-12-20 PROCEDURE — 2500000003 HC RX 250 WO HCPCS: Performed by: ORTHOPAEDIC SURGERY

## 2024-12-20 PROCEDURE — 2709999900 HC NON-CHARGEABLE SUPPLY: Performed by: ORTHOPAEDIC SURGERY

## 2024-12-20 PROCEDURE — 7100000011 HC PHASE II RECOVERY - ADDTL 15 MIN: Performed by: ORTHOPAEDIC SURGERY

## 2024-12-20 PROCEDURE — 72100 X-RAY EXAM L-S SPINE 2/3 VWS: CPT

## 2024-12-20 PROCEDURE — 6360000002 HC RX W HCPCS: Performed by: STUDENT IN AN ORGANIZED HEALTH CARE EDUCATION/TRAINING PROGRAM

## 2024-12-20 PROCEDURE — 3600000004 HC SURGERY LEVEL 4 BASE: Performed by: ORTHOPAEDIC SURGERY

## 2024-12-20 RX ORDER — OXYCODONE HYDROCHLORIDE 5 MG/1
5 TABLET ORAL PRN
Status: COMPLETED | OUTPATIENT
Start: 2024-12-20 | End: 2024-12-20

## 2024-12-20 RX ORDER — PROPOFOL 10 MG/ML
INJECTION, EMULSION INTRAVENOUS
Status: DISCONTINUED | OUTPATIENT
Start: 2024-12-20 | End: 2024-12-20 | Stop reason: SDUPTHER

## 2024-12-20 RX ORDER — EPHEDRINE SULFATE 50 MG/ML
INJECTION INTRAVENOUS
Status: DISCONTINUED | OUTPATIENT
Start: 2024-12-20 | End: 2024-12-20 | Stop reason: SDUPTHER

## 2024-12-20 RX ORDER — LABETALOL HYDROCHLORIDE 5 MG/ML
5 INJECTION, SOLUTION INTRAVENOUS EVERY 10 MIN PRN
Status: DISCONTINUED | OUTPATIENT
Start: 2024-12-20 | End: 2024-12-20 | Stop reason: HOSPADM

## 2024-12-20 RX ORDER — LIDOCAINE HYDROCHLORIDE 10 MG/ML
1 INJECTION, SOLUTION EPIDURAL; INFILTRATION; INTRACAUDAL; PERINEURAL
Status: DISCONTINUED | OUTPATIENT
Start: 2024-12-20 | End: 2024-12-20 | Stop reason: HOSPADM

## 2024-12-20 RX ORDER — DROPERIDOL 2.5 MG/ML
0.62 INJECTION, SOLUTION INTRAMUSCULAR; INTRAVENOUS
Status: DISCONTINUED | OUTPATIENT
Start: 2024-12-20 | End: 2024-12-20 | Stop reason: HOSPADM

## 2024-12-20 RX ORDER — DEXAMETHASONE SODIUM PHOSPHATE 4 MG/ML
INJECTION, SOLUTION INTRA-ARTICULAR; INTRALESIONAL; INTRAMUSCULAR; INTRAVENOUS; SOFT TISSUE
Status: DISCONTINUED | OUTPATIENT
Start: 2024-12-20 | End: 2024-12-20 | Stop reason: SDUPTHER

## 2024-12-20 RX ORDER — FENTANYL CITRATE 50 UG/ML
INJECTION, SOLUTION INTRAMUSCULAR; INTRAVENOUS
Status: DISCONTINUED | OUTPATIENT
Start: 2024-12-20 | End: 2024-12-20 | Stop reason: SDUPTHER

## 2024-12-20 RX ORDER — SODIUM CHLORIDE 9 MG/ML
INJECTION, SOLUTION INTRAVENOUS PRN
Status: DISCONTINUED | OUTPATIENT
Start: 2024-12-20 | End: 2024-12-20 | Stop reason: HOSPADM

## 2024-12-20 RX ORDER — DIPHENHYDRAMINE HYDROCHLORIDE 50 MG/ML
12.5 INJECTION INTRAMUSCULAR; INTRAVENOUS
Status: DISCONTINUED | OUTPATIENT
Start: 2024-12-20 | End: 2024-12-20 | Stop reason: HOSPADM

## 2024-12-20 RX ORDER — OXYCODONE HYDROCHLORIDE 5 MG/1
10 TABLET ORAL PRN
Status: COMPLETED | OUTPATIENT
Start: 2024-12-20 | End: 2024-12-20

## 2024-12-20 RX ORDER — MIDAZOLAM HYDROCHLORIDE 1 MG/ML
2 INJECTION, SOLUTION INTRAMUSCULAR; INTRAVENOUS
Status: DISCONTINUED | OUTPATIENT
Start: 2024-12-20 | End: 2024-12-20 | Stop reason: HOSPADM

## 2024-12-20 RX ORDER — OXYCODONE AND ACETAMINOPHEN 5; 325 MG/1; MG/1
2 TABLET ORAL
Qty: 40 TABLET | Refills: 0 | Status: SHIPPED | OUTPATIENT
Start: 2024-12-20 | End: 2025-01-24

## 2024-12-20 RX ORDER — SODIUM CHLORIDE, SODIUM LACTATE, POTASSIUM CHLORIDE, CALCIUM CHLORIDE 600; 310; 30; 20 MG/100ML; MG/100ML; MG/100ML; MG/100ML
INJECTION, SOLUTION INTRAVENOUS CONTINUOUS
Status: DISCONTINUED | OUTPATIENT
Start: 2024-12-20 | End: 2024-12-20 | Stop reason: HOSPADM

## 2024-12-20 RX ORDER — SODIUM CHLORIDE 0.9 % (FLUSH) 0.9 %
5-40 SYRINGE (ML) INJECTION EVERY 12 HOURS SCHEDULED
Status: DISCONTINUED | OUTPATIENT
Start: 2024-12-20 | End: 2024-12-20 | Stop reason: HOSPADM

## 2024-12-20 RX ORDER — ROCURONIUM BROMIDE 10 MG/ML
INJECTION, SOLUTION INTRAVENOUS
Status: DISCONTINUED | OUTPATIENT
Start: 2024-12-20 | End: 2024-12-20 | Stop reason: SDUPTHER

## 2024-12-20 RX ORDER — LIDOCAINE HYDROCHLORIDE 20 MG/ML
INJECTION, SOLUTION EPIDURAL; INFILTRATION; INTRACAUDAL; PERINEURAL
Status: DISCONTINUED | OUTPATIENT
Start: 2024-12-20 | End: 2024-12-20 | Stop reason: SDUPTHER

## 2024-12-20 RX ORDER — SODIUM CHLORIDE 0.9 % (FLUSH) 0.9 %
5-40 SYRINGE (ML) INJECTION PRN
Status: DISCONTINUED | OUTPATIENT
Start: 2024-12-20 | End: 2024-12-20 | Stop reason: HOSPADM

## 2024-12-20 RX ORDER — ONDANSETRON 2 MG/ML
INJECTION INTRAMUSCULAR; INTRAVENOUS
Status: DISCONTINUED | OUTPATIENT
Start: 2024-12-20 | End: 2024-12-20 | Stop reason: SDUPTHER

## 2024-12-20 RX ORDER — BUPIVACAINE HYDROCHLORIDE AND EPINEPHRINE 2.5; 5 MG/ML; UG/ML
INJECTION, SOLUTION EPIDURAL; INFILTRATION; INTRACAUDAL; PERINEURAL PRN
Status: DISCONTINUED | OUTPATIENT
Start: 2024-12-20 | End: 2024-12-20 | Stop reason: ALTCHOICE

## 2024-12-20 RX ORDER — NALOXONE HYDROCHLORIDE 0.4 MG/ML
INJECTION, SOLUTION INTRAMUSCULAR; INTRAVENOUS; SUBCUTANEOUS PRN
Status: DISCONTINUED | OUTPATIENT
Start: 2024-12-20 | End: 2024-12-20 | Stop reason: HOSPADM

## 2024-12-20 RX ORDER — DOCUSATE SODIUM 100 MG/1
100 CAPSULE, LIQUID FILLED ORAL 2 TIMES DAILY PRN
Qty: 30 CAPSULE | Refills: 1 | Status: SHIPPED | OUTPATIENT
Start: 2024-12-20

## 2024-12-20 RX ORDER — APREPITANT 40 MG/1
40 CAPSULE ORAL ONCE
Status: COMPLETED | OUTPATIENT
Start: 2024-12-20 | End: 2024-12-20

## 2024-12-20 RX ADMIN — Medication 2 AMPULE: at 08:41

## 2024-12-20 RX ADMIN — CEFAZOLIN 2000 MG: 2 INJECTION, POWDER, FOR SOLUTION INTRAVENOUS at 09:51

## 2024-12-20 RX ADMIN — FENTANYL CITRATE 25 MCG: 50 INJECTION, SOLUTION INTRAMUSCULAR; INTRAVENOUS at 11:18

## 2024-12-20 RX ADMIN — SUGAMMADEX 200 MG: 100 INJECTION, SOLUTION INTRAVENOUS at 11:15

## 2024-12-20 RX ADMIN — APREPITANT 40 MG: 40 CAPSULE ORAL at 08:42

## 2024-12-20 RX ADMIN — ROCURONIUM BROMIDE 25 MG: 50 INJECTION, SOLUTION INTRAVENOUS at 09:44

## 2024-12-20 RX ADMIN — PROPOFOL 25 MG: 10 INJECTION, EMULSION INTRAVENOUS at 09:44

## 2024-12-20 RX ADMIN — OXYCODONE 5 MG: 5 TABLET ORAL at 12:09

## 2024-12-20 RX ADMIN — ONDANSETRON 4 MG: 2 INJECTION INTRAMUSCULAR; INTRAVENOUS at 09:50

## 2024-12-20 RX ADMIN — DEXAMETHASONE SODIUM PHOSPHATE 10 MG: 4 INJECTION, SOLUTION INTRAMUSCULAR; INTRAVENOUS at 09:50

## 2024-12-20 RX ADMIN — EPHEDRINE SULFATE 10 MG: 50 INJECTION INTRAVENOUS at 10:18

## 2024-12-20 RX ADMIN — METHOCARBAMOL 200 MG: 100 INJECTION INTRAMUSCULAR; INTRAVENOUS at 09:40

## 2024-12-20 RX ADMIN — FENTANYL CITRATE 25 MCG: 50 INJECTION, SOLUTION INTRAMUSCULAR; INTRAVENOUS at 10:00

## 2024-12-20 RX ADMIN — LIDOCAINE HYDROCHLORIDE 100 MG: 20 INJECTION, SOLUTION EPIDURAL; INFILTRATION; INTRACAUDAL at 09:44

## 2024-12-20 RX ADMIN — SODIUM CHLORIDE, SODIUM LACTATE, POTASSIUM CHLORIDE, AND CALCIUM CHLORIDE: .6; .31; .03; .02 INJECTION, SOLUTION INTRAVENOUS at 09:40

## 2024-12-20 RX ADMIN — EPHEDRINE SULFATE 10 MG: 50 INJECTION INTRAVENOUS at 10:29

## 2024-12-20 RX ADMIN — EPHEDRINE SULFATE 10 MG: 50 INJECTION INTRAVENOUS at 10:09

## 2024-12-20 ASSESSMENT — PAIN DESCRIPTION - LOCATION
LOCATION: BACK
LOCATION: LEG

## 2024-12-20 ASSESSMENT — PAIN DESCRIPTION - ONSET
ONSET: GRADUAL
ONSET: ON-GOING

## 2024-12-20 ASSESSMENT — PAIN DESCRIPTION - PAIN TYPE
TYPE: ACUTE PAIN;CHRONIC PAIN
TYPE: ACUTE PAIN;SURGICAL PAIN

## 2024-12-20 ASSESSMENT — PAIN SCALES - GENERAL
PAINLEVEL_OUTOF10: 6
PAINLEVEL_OUTOF10: 2

## 2024-12-20 ASSESSMENT — PAIN DESCRIPTION - ORIENTATION
ORIENTATION: RIGHT
ORIENTATION: LOWER

## 2024-12-20 ASSESSMENT — PAIN - FUNCTIONAL ASSESSMENT
PAIN_FUNCTIONAL_ASSESSMENT: ACTIVITIES ARE NOT PREVENTED
PAIN_FUNCTIONAL_ASSESSMENT: ACTIVITIES ARE NOT PREVENTED
PAIN_FUNCTIONAL_ASSESSMENT: PREVENTS OR INTERFERES WITH MANY ACTIVE NOT PASSIVE ACTIVITIES
PAIN_FUNCTIONAL_ASSESSMENT: 0-10

## 2024-12-20 ASSESSMENT — PAIN DESCRIPTION - DESCRIPTORS
DESCRIPTORS: DISCOMFORT
DESCRIPTORS: SHARP;THROBBING
DESCRIPTORS: ACHING

## 2024-12-20 ASSESSMENT — PAIN DESCRIPTION - FREQUENCY
FREQUENCY: INTERMITTENT
FREQUENCY: INTERMITTENT

## 2024-12-20 NOTE — PROGRESS NOTES
D: Patient here from OR via stretcher, taken to bay 5, all current drips, treatments, skin issues, and plan of care were reviewed by both RN, patient transferred in stable condition, patient is sedated with minimal response, respirations are even and easy, 0 2 at 2 liters via nasal canula. A: Assessment completed and documented.

## 2024-12-20 NOTE — PROGRESS NOTES
Patient admitted to \[facility]\"" bay 6 in preparation for surgery. Pt A&Ox4. VSS. Consents confirmed. PIV inserted into R hand, fluids infusing. Surgical site prep completed. Belongings labeled, on cart to PACU. NPO since 2000 12/19. Pre procedure medications given, see MAR. Family at bedside, phone number in system for text updates. Call light within reach, all standard safety measures in place.

## 2024-12-20 NOTE — ANESTHESIA POSTPROCEDURE EVALUATION
Department of Anesthesiology  Postprocedure Note    Patient: Fabiana Mckeon  MRN: [id number]  YOB: 1950  Date of evaluation: 12/20/2024    Procedure Summary       Date: 12/20/24 Room / Location: [address]    Anesthesia Start: 0940 Anesthesia Stop: 1129    Procedure: MICROLUMBAR LAMINECTOMY DISCECTOMY RIGHT LUMBAR FOUR TO LUMBAR FIVE WITH EXCISION OF RECURRENT HERNIATED NUCLEUS PULPOSUS (Right: Spine Lumbar) Diagnosis:       Recurrent herniation of lumbar disc      (Recurrent herniation of lumbar disc [M51.26])    Surgeons: Marcos Ortiz MD Responsible Provider: Serafin Matthews MD    Anesthesia Type: general ASA Status: 2            Anesthesia Type: No value filed.    Lan Phase I: Lan Score: 10    Lan Phase II: Lan Score: 10    Anesthesia Post Evaluation    Comments: Postoperative Anesthesia Note    Name:    Fabiana Mckeon  MRN:      [id number]    Patient Vitals in the past 12 hrs:  12/20/24 1230, BP:(!) 131/56, Pulse:71, Resp:18, SpO2:(!) 89 %  12/20/24 1225, BP:138/65, Pulse:75, Resp:20, SpO2:95 %  12/20/24 1221, Temp src:Temporal  12/20/24 1220, BP:130/63, Pulse:87, Resp:(!) 35, SpO2:95 %  12/20/24 1215, BP:(!) 135/55, Pulse:77, Resp:24, SpO2:96 %  12/20/24 1210, Pulse:82, Resp:19, SpO2:95 %  12/20/24 1205, BP:(!) 134/53, Pulse:89, Resp:14, SpO2:96 %  12/20/24 1200, BP:134/63, Pulse:82, Resp:22, SpO2:95 %  12/20/24 1155, BP:(!) 125/53, Pulse:81, Resp:18, SpO2:95 %  12/20/24 1150, BP:(!) 141/58, Pulse:92, Resp:24, SpO2:99 %  12/20/24 1140, BP:(!) 141/60, Temp:96.8 °F (36 °C), Temp src:Temporal, Pulse:85, Resp:24, SpO2:97 %  12/20/24 1135, BP:(!) 139/57, Pulse:82, Resp:22, SpO2:97 %  12/20/24 1130, BP:(!) 148/58, Pulse:83, Resp:10, SpO2:95 %  12/20/24 1117, BP:(!) 148/58, Temp:97.7 °F (36.5 °C), Temp src:Temporal, Pulse:82, Resp:11, SpO2:97 %  12/20/24 0746, BP:(!) 164/79, Temp:97.7 °F (36.5 °C), Temp src:Oral, Pulse:78, Resp:16, SpO2:97 %,

## 2024-12-20 NOTE — PROGRESS NOTES
D: Patient ambulated to bathroom to void, voided then ambulated back to stretcher. A: Reviewed discharge instructions with patient and daughter who acknowledged understanding, patient discharged via wheelchair to private car and with all belongings.

## 2024-12-20 NOTE — PROGRESS NOTES
D: Daughter at bedside now, patient doing well, call light is in reach. A: Sent daughter to outpatient pharmacy.

## 2024-12-20 NOTE — H&P
I have reviewed the history and physical and examined the patient and find no relevant changes.    I have reviewed with the patient and/or family the risks, benefits, and alternatives to the procedure.    The patient was counseled at length about the risks of arnoldo Covid-19 during their perioperative period and any recovery window from their procedure.  The patient was made aware that arnoldo Covid-19  may worsen their prognosis for recovering from their procedure  and lend to a higher morbidity and/or mortality risk.  All material risks, benefits, and reasonable alternatives including postponing the procedure were discussed. The patient does wish to proceed with the procedure at this time.       MEG MACARIO MD  12/20/2024 No intake/output data recorded.

## 2024-12-20 NOTE — DISCHARGE INSTR - DIET
Good nutrition is important when healing from an illness, injury, or surgery.  Follow any nutrition recommendations given to you during your hospital stay.   If you were given an oral nutrition supplement while in the hospital, continue to take this supplement at home.  You can take it with meals, in-between meals, and/or before bedtime. These supplements can be purchased at most local grocery stores, pharmacies, and chain e-contratos-stores.   If you have any questions about your diet or nutrition, call the hospital and ask for the dietitian.  Advance to your regular diet as tolerated

## 2024-12-20 NOTE — PROGRESS NOTES
D: Patient doing well, taking ice chips without difficulty, call light is in reach. A: Call placed to patients friend to update. Moved to phase 2.

## 2024-12-20 NOTE — ANESTHESIA PRE PROCEDURE
Department of Anesthesiology  Preprocedure Note       Name:  Fabiana Mckeon   Age:  74 y.o.  :  1950                                          MRN:  [id number]         Date:  2024      Surgeon: Surgeon(s):  Marcos Ortiz MD    Procedure: Procedure(s):  MICROLUMBAR LAMINECTOMY DISCECTOMY RIGHT LUMBAR FOUR TO LUMBAR FIVE WITH EXCISION OF RECURRENT HERNIATED NUCLEUS PULPOSUS    Medications prior to admission:   Prior to Admission medications    Medication Sig Start Date End Date Taking? Authorizing Provider   pioglitazone (ACTOS) 30 MG tablet Take 1 tablet by mouth nightly   Yes Lacey Mccoy MD   amLODIPine (NORVASC) 5 MG tablet Take 1 tablet by mouth nightly    Lacey Mccoy MD   sitagliptan (JANUVIA) 50 MG tablet Take 1 tablet by mouth daily Indications: unsure of dosage    Lacey Mccoy MD   atorvastatin (LIPITOR) 10 MG tablet Take 1 tablet by mouth nightly Indications: unsure of dosage    Lacey Mccoy MD   lisinopril (PRINIVIL;ZESTRIL) 10 MG tablet Take  by mouth daily. Indications: unsure of dosage    Lacey Mccoy MD       Current medications:    Current Facility-Administered Medications   Medication Dose Route Frequency Provider Last Rate Last Admin    lidocaine PF 1 % injection 1 mL  1 mL IntraDERmal Once PRN Ruben Vogel MD        lactated ringers infusion   IntraVENous Continuous Ruben Vogel MD        sodium chloride flush 0.9 % injection 5-40 mL  5-40 mL IntraVENous 2 times per day Ruben Vogel MD        sodium chloride flush 0.9 % injection 5-40 mL  5-40 mL IntraVENous PRN Ruben Vogel MD        0.9 % sodium chloride infusion   IntraVENous PRN Ruben Vogel MD        midazolam (VERSED) injection 2 mg  2 mg IntraVENous Once PRN Ruben Vogel MD        alcohol 62% (NOZIN) nasal  2 ampule  2 ampule Nasal Once Ruben Vogel MD        sodium

## 2024-12-20 NOTE — DISCHARGE INSTRUCTIONS
ANESTHESIA DISCHARGE INSTRUCTIONS    You are under the influence of drugs- do not drink alcohol, drive a car, operate machinery(such as power tools, kitchen appliances, etc), sign legal documents, or make any important decisions for 24 hours (or while on pain medications).   Children should not ride bikes or skate boards or play on gym sets  for 24 hours after surgery.  A responsible adult should be with you for 24 hours.  Rest at home today- increase activity as tolerated.  Progress slowly to a regular diet unless your physician has instructed you otherwise. Drink plenty of water.    CALL YOUR DOCTOR IF YOU:  Have moderate to severe nausea or vomiting AND are unable to hold down fluids or prescribed medications.  Have bright red bloody drainage from your dressing that won't stop oozing.  Do not get relief with your pain medication    NORMAL (POSSIBLE) SIDE EFFECTS FROM ANESTHESIA:     Confusion, temporary memory loss, delayed reaction times in the first 24 hours  Lightheadedness, dizziness, difficulty focusing, blurred vision  Nausea/vomiting can happen  Shivering, feeling cold, sore throat, cough and muscle aches should stop within 24-48 hours  Trouble urinating - call your surgeon if it has been more than 8 hrs  Bruising or soreness at the IV site - call if it remains red, firm or there is drainage             FEMALES OF CHILDBEARING AGE WHO ARE TAKING BIRTH CONTROL PILLS:  You may have received a medication during your procedure that interferes with the   actions of birth control pills (Bridion or Emend). Use some other kind of birth control in addition to your pills, like a condom, for 1 month after your procedure to prevent unwanted pregnancy.    The following instructions are to be followed if you have a known history or diagnosis of sleep apnea:  For all sleep apnea patients:  ? Sleep on your side or sitting up in a chair whenever possible, especially the first 24 hours after surgery.  ? Use only medicines   These medicines have been given to you to assist in relief of pain related to your surgery.  It will be helpful to you to keep your pain under control in order to assist you in completing the recommended daily walks.    Returning to work:  Some patients can return to a sedentary job in 1-3 days. Heavy  lifting jobs require more time off of work. Please discuss this with the doctor on your first postoperative office visit.  Healing may take up to 3 or more  months after a major spine surgery.    Diabetics:  Sometimes blood sugars are elevated after a surgery and may take up to a week to return to your usual levels.  As well, if you have been prescribed a Medrol Steroid Pack, your sugars are likely to become somewhat elevated for a short period of time.  Please monitor your blood sugars daily and call your primary doctor if the levels become higher than 200.    Follow-up Appointment:  You should contact the office for an appointment with the doctor or his assistant 2-3 weeks after surgery.      Dr. Marcos Ortiz       [phone]

## 2024-12-20 NOTE — PROGRESS NOTES
D: Patient now awake and alert, oriented x 4, C/O of mild pain in right leg that is tolerable, call light is in reach. A: Discussed plan of care with patient who agreed.

## 2024-12-20 NOTE — PROGRESS NOTES
D: Daughter back with prescriptions, patient is getting dressed with help from daughter, call light is in reach.

## 2024-12-20 NOTE — OP NOTE
Operative Note      Patient: Fabiana Mckeon  YOB: 1950  MRN: [id number]    Date of Procedure: 12/20/2024    Pre-Op Diagnosis Codes:      * Recurrent herniation of lumbar disc [M51.26]    Post-Op Diagnosis: Same       Procedure(s):  MICROLUMBAR LAMINECTOMY DISCECTOMY RIGHT LUMBAR FOUR TO LUMBAR FIVE WITH EXCISION OF RECURRENT HERNIATED NUCLEUS PULPOSUS    Surgeon(s):  Marcos Ortiz MD    Assistant:   Surgical Assistant: Geoffrey Crook    Anesthesia: General    Estimated Blood Loss (mL): less than 50     Complications: None    Specimens:   * No specimens in log *    Implants:  * No implants in log *      Drains: * No LDAs found *    Findings:  Infection Present At Time Of Surgery (PATOS) (choose all levels that have infection present):  No infection present  Other Findings: hnp    Detailed Description of Procedure:     INDICATIONS FOR SURGERY:   Fabiana Mckeon is a 74 y.o. female with back and right leg pain. She is s/p bilateral laminotomies and partial facetectomies L4-5.  The symptoms failed to respond to conservative intervention.  An MRI scan was performed and this showed evidence of a recurrent disk herniation at the L4-5 level on the right.  Having failed conservative management and experiencing persistent symptoms, the patient elected to proceed ahead with the surgical option of microdiskectomy at L4-5.    DETAILS OF PROCEDURE:  The patient was brought to the operating room and placed under general anesthesia.  The patient was then placed prone on a Marco table.  All bony prominences were inspected and padded prior to sterile draping.  Using a #10 blade knife, the skin was incised in the midline and monopolar cautery was used to dissect through the subcutaneous tissue to open the fascia and reflect the paraspinal muscles laterally exposing the L4-5 interspace on the right side.  The microscope was then brought into the field and used to assist with performing a microsurgical

## 2025-01-08 ENCOUNTER — OFFICE VISIT (OUTPATIENT)
Dept: ORTHOPEDIC SURGERY | Age: 75
End: 2025-01-08

## 2025-01-08 ENCOUNTER — HOSPITAL ENCOUNTER (OUTPATIENT)
Dept: WOMENS IMAGING | Age: 75
Discharge: HOME | End: 2025-01-08
Payer: MEDICARE

## 2025-01-08 VITALS — WEIGHT: 179 LBS | HEIGHT: 61 IN | BODY MASS INDEX: 33.79 KG/M2

## 2025-01-08 VITALS — BODY MASS INDEX: 33.04 KG/M2 | HEIGHT: 61 IN | WEIGHT: 175 LBS

## 2025-01-08 DIAGNOSIS — Z12.31 SCREENING MAMMOGRAM FOR BREAST CANCER: ICD-10-CM

## 2025-01-08 DIAGNOSIS — M51.26 RECURRENT HERNIATION OF LUMBAR DISC: Primary | ICD-10-CM

## 2025-01-08 PROCEDURE — 99024 POSTOP FOLLOW-UP VISIT: CPT | Performed by: ORTHOPAEDIC SURGERY

## 2025-01-08 PROCEDURE — 77063 BREAST TOMOSYNTHESIS BI: CPT

## 2025-01-08 NOTE — PROGRESS NOTES
Ms. Fabiana Mckeon returns today 3 weeks s/p MLD right L4-L5. She reports marked improvement of her leg symptoms.     Her incisions show no signs of infection.  She has a normal gait and 5/5 strength of her ankle DFs/PFs, bilaterally.  Her sensation is intact from L3 to S1 bilaterally.    I cautioned her to avoid lifting more than 10 pounds, bending and impact type aerobic exercise for 12 weeks after surgery, then slowly increase her activity over the next month.

## 2025-02-03 ENCOUNTER — TELEPHONE (OUTPATIENT)
Dept: ORTHOPEDIC SURGERY | Age: 75
End: 2025-02-03

## 2025-02-03 NOTE — TELEPHONE ENCOUNTER
General Question     Subject: LUMBAR   Patient and /or Facility Request: Fabiana Mckeon   Contact Number: [phone]     PATIENT REQUESTING A CALL BACK REGARDING HER LUMBAR SPINE       PLEASE CALL THE PATIENT BACK AT THE ABOVE NUMBER

## 2025-02-12 ENCOUNTER — TELEPHONE (OUTPATIENT)
Dept: ORTHOPEDIC SURGERY | Age: 75
End: 2025-02-12

## 2025-02-12 ENCOUNTER — OFFICE VISIT (OUTPATIENT)
Dept: ORTHOPEDIC SURGERY | Age: 75
End: 2025-02-12

## 2025-02-12 VITALS — BODY MASS INDEX: 33.04 KG/M2 | HEIGHT: 61 IN | WEIGHT: 175 LBS

## 2025-02-12 DIAGNOSIS — M51.26 RECURRENT HERNIATION OF LUMBAR DISC: Primary | ICD-10-CM

## 2025-02-12 PROCEDURE — 99024 POSTOP FOLLOW-UP VISIT: CPT | Performed by: ORTHOPAEDIC SURGERY

## 2025-02-12 NOTE — PROGRESS NOTES
Ms. Fabiana Mckeon returns today 2 months s/p MLD right L4-L5. She reports marked improvement of her leg symptoms.     Her incisions show no signs of infection.  She has a normal gait and 5/5 strength of her ankle DFs/PFs, bilaterally.  Her sensation is intact from L3 to S1 bilaterally.    I cautioned her to avoid lifting more than 10 pounds, bending and impact type aerobic exercise for 12 weeks after surgery, then slowly increase her activity over the next month.

## 2025-02-26 ENCOUNTER — TELEPHONE (OUTPATIENT)
Dept: ORTHOPEDIC SURGERY | Age: 75
End: 2025-02-26

## 2025-02-26 NOTE — TELEPHONE ENCOUNTER
PT IS REQUESTING A CALL BACK IN REGARDS TO SHORT TERM DISABILITY PAPERWORK. PT IS REQUESTING CALL BACK FROM [facility], AND CAN BE REACHED .[phone]     PT WILL BE RETURNING TO WORK MONDAY MARCH 3RD, AND WANTS TO KNOW WILL SHE HAVE ANY RESTRICTIONS.

## 2025-04-17 ENCOUNTER — HOSPITAL ENCOUNTER (OUTPATIENT)
Dept: PHYSICAL THERAPY | Age: 75
Setting detail: THERAPIES SERIES
Discharge: HOME | End: 2025-04-17
Payer: COMMERCIAL

## 2025-04-17 DIAGNOSIS — M54.50 LOW BACK PAIN, UNSPECIFIED BACK PAIN LATERALITY, UNSPECIFIED CHRONICITY, UNSPECIFIED WHETHER SCIATICA PRESENT: Primary | ICD-10-CM

## 2025-04-17 DIAGNOSIS — R29.898 WEAKNESS OF LOWER EXTREMITY, UNSPECIFIED LATERALITY: ICD-10-CM

## 2025-04-17 DIAGNOSIS — M53.3 COCCYX PAIN: ICD-10-CM

## 2025-04-17 PROCEDURE — 97530 THERAPEUTIC ACTIVITIES: CPT

## 2025-04-17 PROCEDURE — 97110 THERAPEUTIC EXERCISES: CPT

## 2025-04-17 PROCEDURE — 97161 PT EVAL LOW COMPLEX 20 MIN: CPT

## 2025-04-17 NOTE — PLAN OF CARE
Progressing: [] Met: [] Not Met: [] Adjusted  2. Patient will have a decrease in pain to <0/10 to facilitate improvement in movement, function, and ADLs as indicated by Functional Deficits.  [] Progressing: [] Met: [] Not Met: [] Adjusted    IF APPLICABLE:  [] Patient to demonstrate independence in wear and care for custom orthotic device. (Only if applicable for orthotic eval)     Long Term Goals: To be achieved in: 6 weeks  1. Disability index score of 30% or less for the Modified Oswestry to assist with reaching prior level of function with activities such as working.  [] Progressing: [] Met: [] Not Met: [] Adjusted  2. Patient will demonstrate increased AROM of lumbar flexion to 60 deg without pain to allow for proper joint functioning to enable patient to bend forward.   [] Progressing: [] Met: [] Not Met: [] Adjusted  3. Patient will demonstrate increased Strength of BLE to at least 4+/5 throughout without pain to allow for proper functional mobility to enable patient to return to squatting down.   [] Progressing: [] Met: [] Not Met: [] Adjusted  4. Patient will return to sitting for 2 hours without increased symptoms or restriction.   [] Progressing: [] Met: [] Not Met: [] Adjusted  5. Patient will be able to drive without increased pain or limitations.     [] Progressing: [] Met: [] Not Met: [] Adjusted        Overall Progression Towards Functional goals/ Treatment Progress Update:  [] Patient is progressing as expected towards functional goals listed.    [] Progression is slowed due to complexities/Impairments listed.  [] Progression has been slowed due to co-morbidities.  [x] Plan just implemented, too soon (<30days) to assess goals progression   [] Goals require adjustment due to lack of progress  [] Patient is not progressing as expected and requires additional follow up with physician  [] Other:     TREATMENT PLAN     Frequency/Duration: 2x/week for 6 weeks for the following treatment

## 2025-04-21 ENCOUNTER — HOSPITAL ENCOUNTER (OUTPATIENT)
Dept: PHYSICAL THERAPY | Age: 75
Setting detail: THERAPIES SERIES
Discharge: HOME | End: 2025-04-21
Payer: COMMERCIAL

## 2025-04-21 PROCEDURE — 97110 THERAPEUTIC EXERCISES: CPT

## 2025-04-21 PROCEDURE — 97140 MANUAL THERAPY 1/> REGIONS: CPT

## 2025-04-21 NOTE — FLOWSHEET NOTE
[facility] - Outpatient Rehabilitation and Therapy: [address] [place] [address] office: [phone] fax: [phone]      Physical Therapy: TREATMENT/PROGRESS NOTE   Patient: Fabiana Mckeon (74 y.o. female)   Examination Date: 2025   :  1950 MRN: [id number]   Visit #:   C-9 12 visits until 25  Insurance Allowable Auth Needed   C-9 []Yes    []No    Insurance: Payor: [facility] / Plan: CCMSI / Product Type: *No Product type* /   Insurance ID: [id number] - (Worker's Comp)  Secondary Insurance (if applicable):    Treatment Diagnosis:     ICD-10-CM    1. Low back pain, unspecified back pain laterality, unspecified chronicity, unspecified whether sciatica present  M54.50       2. Coccyx pain  M53.3       3. Weakness of lower extremity, unspecified laterality  R29.898          Medical Diagnosis:  Lumbar strain [S39.012A]  Coccyx contusion [S30.0XXA]   Referring Physician: Víctor Hough MD  PCP: Sharonda Muniz MD     Plan of care signed (Y/N): Y    Date of Patient follow up with Physician: PRN     Plan of Care Report: NO  POC update due: (10 visits /OR AUTH LIMITS, whichever is less)  2025                                             Medical History:  Comorbidities:  Diabetes (Type I or II)  Hypertension  Prior Surgeries: microlumbar laminectomy L4-5 22, microlumbar laminectomy disectomy R L4-L5 24  Relevant Medical History: see chart                                         Precautions/ Contra-indications:           Latex allergy:  NO  Pacemaker:    NO  Contraindications for Manipulation: NA      Red Flags:  None    Suicide Screening:   The patient did not verbalize a primary behavioral concern, suicidal ideation, suicidal intent, or demonstrate suicidal behaviors.    Preferred Language for Healthcare:   [x] English       [] other:    SUBJECTIVE EXAMINATION     Patient stated complaint: Reports that the exercises have been going well, only

## 2025-04-24 ENCOUNTER — HOSPITAL ENCOUNTER (OUTPATIENT)
Dept: PHYSICAL THERAPY | Age: 75
Setting detail: THERAPIES SERIES
Discharge: HOME | End: 2025-04-24
Payer: COMMERCIAL

## 2025-04-24 PROCEDURE — 97110 THERAPEUTIC EXERCISES: CPT

## 2025-04-24 PROCEDURE — 97140 MANUAL THERAPY 1/> REGIONS: CPT

## 2025-04-24 NOTE — FLOWSHEET NOTE
[facility] - Outpatient Rehabilitation and Therapy: [address] [place] [address] office: [phone] fax: [phone]      Physical Therapy: TREATMENT/PROGRESS NOTE   Patient: Fabiana Mckeon (74 y.o. female)   Examination Date: 2025   :  1950 MRN: [id number]   Visit #: 3/12  C-9 12 visits until 25  Insurance Allowable Auth Needed   C-9 []Yes    []No    Insurance: Payor: [facility] / Plan: CCMSI / Product Type: *No Product type* /   Insurance ID: [id number] - (Worker's Comp)  Secondary Insurance (if applicable):    Treatment Diagnosis:     ICD-10-CM    1. Low back pain, unspecified back pain laterality, unspecified chronicity, unspecified whether sciatica present  M54.50       2. Coccyx pain  M53.3       3. Weakness of lower extremity, unspecified laterality  R29.898          Medical Diagnosis:  Lumbar strain [S39.012A]  Coccyx contusion [S30.0XXA]   Referring Physician: Víctor Hough MD  PCP: Sharonda Muniz MD     Plan of care signed (Y/N): Y    Date of Patient follow up with Physician: PRN     Plan of Care Report: NO  POC update due: (10 visits /OR AUTH LIMITS, whichever is less)  2025                                             Medical History:  Comorbidities:  Diabetes (Type I or II)  Hypertension  Prior Surgeries: microlumbar laminectomy L4-5 22, microlumbar laminectomy disectomy R L4-L5 24  Relevant Medical History: see chart                                         Precautions/ Contra-indications:           Latex allergy:  NO  Pacemaker:    NO  Contraindications for Manipulation: NA      Red Flags:  None    Suicide Screening:   The patient did not verbalize a primary behavioral concern, suicidal ideation, suicidal intent, or demonstrate suicidal behaviors.    Preferred Language for Healthcare:   [x] English       [] other:    SUBJECTIVE EXAMINATION     Patient stated complaint: Felt that last night she did not have any pain for the

## 2025-04-28 ENCOUNTER — HOSPITAL ENCOUNTER (OUTPATIENT)
Dept: PHYSICAL THERAPY | Age: 75
Setting detail: THERAPIES SERIES
Discharge: HOME | End: 2025-04-28
Payer: COMMERCIAL

## 2025-04-28 PROCEDURE — 97110 THERAPEUTIC EXERCISES: CPT

## 2025-04-28 PROCEDURE — 97140 MANUAL THERAPY 1/> REGIONS: CPT

## 2025-04-28 NOTE — FLOWSHEET NOTE
modulating pain, and promoting relaxation. Next visit plan to continue current phase     Electronically Signed by Lizbet Christopher PT  Date: 04/28/2025     Note: Portions of this note have been templated and/or copied from initial evaluation, reassessments and prior notes for documentation efficiency.    Note: If patient does not return for scheduled/recommended follow up visits, this note will serve as a discharge from care along with the most recent update on progress.    Ortho Evaluation

## 2025-05-01 ENCOUNTER — HOSPITAL ENCOUNTER (OUTPATIENT)
Dept: PHYSICAL THERAPY | Age: 75
Setting detail: THERAPIES SERIES
Discharge: HOME | End: 2025-05-01
Payer: COMMERCIAL

## 2025-05-01 PROCEDURE — 97110 THERAPEUTIC EXERCISES: CPT

## 2025-05-01 PROCEDURE — 97140 MANUAL THERAPY 1/> REGIONS: CPT

## 2025-05-01 PROCEDURE — 97112 NEUROMUSCULAR REEDUCATION: CPT

## 2025-05-01 NOTE — FLOWSHEET NOTE
[facility] - Outpatient Rehabilitation and Therapy: [address] [place] [address] office: [phone] fax: [phone]      Physical Therapy: TREATMENT/PROGRESS NOTE   Patient: Fabiana Mckeon (74 y.o. female)   Examination Date: 2025   :  1950 MRN: [id number]   Visit #:   C-9 12 visits until 25  Insurance Allowable Auth Needed   C-9 []Yes    []No    Insurance: Payor: [facility] / Plan: CCMSI / Product Type: *No Product type* /   Insurance ID: [id number] - (Worker's Comp)  Secondary Insurance (if applicable):    Treatment Diagnosis:     ICD-10-CM    1. Low back pain, unspecified back pain laterality, unspecified chronicity, unspecified whether sciatica present  M54.50       2. Coccyx pain  M53.3       3. Weakness of lower extremity, unspecified laterality  R29.898          Medical Diagnosis:  Lumbar strain [S39.012A]  Coccyx contusion [S30.0XXA]   Referring Physician: Víctor Hough MD  PCP: Sharonda Muniz MD     Plan of care signed (Y/N): Y    Date of Patient follow up with Physician: PRN     Plan of Care Report: NO  POC update due: (10 visits /OR AUTH LIMITS, whichever is less)  2025                                             Medical History:  Comorbidities:  Diabetes (Type I or II)  Hypertension  Prior Surgeries: microlumbar laminectomy L4-5 22, microlumbar laminectomy disectomy R L4-L5 24  Relevant Medical History: see chart                                         Precautions/ Contra-indications:           Latex allergy:  NO  Pacemaker:    NO  Contraindications for Manipulation: NA      Red Flags:  None    Suicide Screening:   The patient did not verbalize a primary behavioral concern, suicidal ideation, suicidal intent, or demonstrate suicidal behaviors.    Preferred Language for Healthcare:   [x] English       [] other:    SUBJECTIVE EXAMINATION     Patient stated complaint: States that she is feeling better, tailbone feels tender,

## 2025-05-05 ENCOUNTER — HOSPITAL ENCOUNTER (OUTPATIENT)
Dept: PHYSICAL THERAPY | Age: 75
Setting detail: THERAPIES SERIES
Discharge: HOME | End: 2025-05-05
Payer: COMMERCIAL

## 2025-05-05 PROCEDURE — 97140 MANUAL THERAPY 1/> REGIONS: CPT

## 2025-05-05 PROCEDURE — 97110 THERAPEUTIC EXERCISES: CPT

## 2025-05-05 NOTE — FLOWSHEET NOTE
[facility] - Outpatient Rehabilitation and Therapy: [address] [place] [address] office: [phone] fax: [phone]      Physical Therapy: TREATMENT/PROGRESS NOTE   Patient: Fabiana Mckeon (74 y.o. female)   Examination Date: 2025   :  1950 MRN: [id number]   Visit #:   C-9 12 visits until 25  Insurance Allowable Auth Needed   C-9 []Yes    []No    Insurance: Payor: [facility] / Plan: CCMSI / Product Type: *No Product type* /   Insurance ID: [id number] - (Worker's Comp)  Secondary Insurance (if applicable):    Treatment Diagnosis:     ICD-10-CM    1. Low back pain, unspecified back pain laterality, unspecified chronicity, unspecified whether sciatica present  M54.50       2. Coccyx pain  M53.3       3. Weakness of lower extremity, unspecified laterality  R29.898          Medical Diagnosis:  Lumbar strain [S39.012A]  Coccyx contusion [S30.0XXA]   Referring Physician: Víctor Hough MD  PCP: Sharonda Muniz MD     Plan of care signed (Y/N): Y    Date of Patient follow up with Physician: PRN     Plan of Care Report: NO  POC update due: (10 visits /OR AUTH LIMITS, whichever is less)  2025                                             Medical History:  Comorbidities:  Diabetes (Type I or II)  Hypertension  Prior Surgeries: microlumbar laminectomy L4-5 22, microlumbar laminectomy disectomy R L4-L5 24  Relevant Medical History: see chart                                         Precautions/ Contra-indications:           Latex allergy:  NO  Pacemaker:    NO  Contraindications for Manipulation: NA      Red Flags:  None    Suicide Screening:   The patient did not verbalize a primary behavioral concern, suicidal ideation, suicidal intent, or demonstrate suicidal behaviors.    Preferred Language for Healthcare:   [x] English       [] other:    SUBJECTIVE EXAMINATION     Patient stated complaint: L glut is sore/painful this morning. Had to go into work

## 2025-05-08 ENCOUNTER — HOSPITAL ENCOUNTER (OUTPATIENT)
Dept: PHYSICAL THERAPY | Age: 75
Setting detail: THERAPIES SERIES
Discharge: HOME | End: 2025-05-08
Payer: COMMERCIAL

## 2025-05-08 PROCEDURE — 97140 MANUAL THERAPY 1/> REGIONS: CPT

## 2025-05-08 PROCEDURE — 97110 THERAPEUTIC EXERCISES: CPT

## 2025-05-08 PROCEDURE — 97112 NEUROMUSCULAR REEDUCATION: CPT

## 2025-05-08 NOTE — FLOWSHEET NOTE
indicated to include: Gr I-IV mobilizations, Soft Tissue Mobilization, Trigger Point Release, and Myofascial Release  Modalities as needed that may include: Thermal Agents  Patient education on postural re-education    Plan: Cont POC- Continue emphasis/focus on improving proper muscle recruitment and activation/motor control patterns, modulating pain, and promoting relaxation. Next visit plan to continue current phase     Electronically Signed by Lizbet Christopher PT  Date: 05/08/2025     Note: Portions of this note have been templated and/or copied from initial evaluation, reassessments and prior notes for documentation efficiency.    Note: If patient does not return for scheduled/recommended follow up visits, this note will serve as a discharge from care along with the most recent update on progress.    Ortho Evaluation

## 2025-05-12 ENCOUNTER — HOSPITAL ENCOUNTER (OUTPATIENT)
Dept: PHYSICAL THERAPY | Age: 75
Setting detail: THERAPIES SERIES
Discharge: HOME | End: 2025-05-12
Payer: COMMERCIAL

## 2025-05-12 PROCEDURE — 97112 NEUROMUSCULAR REEDUCATION: CPT

## 2025-05-12 PROCEDURE — 97140 MANUAL THERAPY 1/> REGIONS: CPT

## 2025-05-12 PROCEDURE — 97110 THERAPEUTIC EXERCISES: CPT

## 2025-05-12 NOTE — FLOWSHEET NOTE
[facility] - Outpatient Rehabilitation and Therapy: [address] [place] [address] office: [phone] fax: [phone]      Physical Therapy: TREATMENT/PROGRESS NOTE   Patient: Fabiana Mckeon (74 y.o. female)   Examination Date: 2025   :  1950 MRN: [id number]   Visit #:   C-9 12 visits until 25  Insurance Allowable Auth Needed   C-9 []Yes    []No    Insurance: Payor: [facility] / Plan: CCMSI / Product Type: *No Product type* /   Insurance ID: [id number] - (Worker's Comp)  Secondary Insurance (if applicable):    Treatment Diagnosis:     ICD-10-CM    1. Low back pain, unspecified back pain laterality, unspecified chronicity, unspecified whether sciatica present  M54.50       2. Coccyx pain  M53.3       3. Weakness of lower extremity, unspecified laterality  R29.898          Medical Diagnosis:  Lumbar strain [S39.012A]  Coccyx contusion [S30.0XXA]   Referring Physician: Víctor Hough MD  PCP: Sharonda Muniz MD     Plan of care signed (Y/N): Y    Date of Patient follow up with Physician: PRN     Plan of Care Report: NO  POC update due: (10 visits /OR AUTH LIMITS, whichever is less)  2025                                             Medical History:  Comorbidities:  Diabetes (Type I or II)  Hypertension  Prior Surgeries: microlumbar laminectomy L4-5 22, microlumbar laminectomy disectomy R L4-L5 24  Relevant Medical History: see chart                                         Precautions/ Contra-indications:           Latex allergy:  NO  Pacemaker:    NO  Contraindications for Manipulation: NA      Red Flags:  None    Suicide Screening:   The patient did not verbalize a primary behavioral concern, suicidal ideation, suicidal intent, or demonstrate suicidal behaviors.    Preferred Language for Healthcare:   [x] English       [] other:    SUBJECTIVE EXAMINATION     Patient stated complaint: States she is tired from working last night. Back is only

## 2025-05-15 ENCOUNTER — HOSPITAL ENCOUNTER (OUTPATIENT)
Dept: PHYSICAL THERAPY | Age: 75
Setting detail: THERAPIES SERIES
Discharge: HOME | End: 2025-05-15
Payer: COMMERCIAL

## 2025-05-15 PROCEDURE — 97140 MANUAL THERAPY 1/> REGIONS: CPT

## 2025-05-15 PROCEDURE — 97110 THERAPEUTIC EXERCISES: CPT

## 2025-05-15 NOTE — FLOWSHEET NOTE
[facility] - Outpatient Rehabilitation and Therapy: [address] [place] [address] office: [phone] fax: [phone]      Physical Therapy: TREATMENT/PROGRESS NOTE   Patient: Fabiana Mckeon (74 y.o. female)   Examination Date: 05/15/2025   :  1950 MRN: [id number]   Visit #:   C-9 12 visits until 25  Insurance Allowable Auth Needed   C-9 []Yes    []No    Insurance: Payor: [facility] / Plan: CCMSI / Product Type: *No Product type* /   Insurance ID: [id number] - (Worker's Comp)  Secondary Insurance (if applicable):    Treatment Diagnosis:     ICD-10-CM    1. Low back pain, unspecified back pain laterality, unspecified chronicity, unspecified whether sciatica present  M54.50       2. Coccyx pain  M53.3       3. Weakness of lower extremity, unspecified laterality  R29.898          Medical Diagnosis:  Lumbar strain [S39.012A]  Coccyx contusion [S30.0XXA]   Referring Physician: Víctor Hough MD  PCP: Sharonda Muniz MD     Plan of care signed (Y/N): Y    Date of Patient follow up with Physician: PRN     Plan of Care Report: NO  POC update due: (10 visits /OR AUTH LIMITS, whichever is less)  2025                                             Medical History:  Comorbidities:  Diabetes (Type I or II)  Hypertension  Prior Surgeries: microlumbar laminectomy L4-5 22, microlumbar laminectomy disectomy R L4-L5 24  Relevant Medical History: see chart                                         Precautions/ Contra-indications:           Latex allergy:  NO  Pacemaker:    NO  Contraindications for Manipulation: NA      Red Flags:  None    Suicide Screening:   The patient did not verbalize a primary behavioral concern, suicidal ideation, suicidal intent, or demonstrate suicidal behaviors.    Preferred Language for Healthcare:   [x] English       [] other:    SUBJECTIVE EXAMINATION     Patient stated complaint: Having more pain today. After last visits she was feeling

## 2025-05-19 ENCOUNTER — HOSPITAL ENCOUNTER (OUTPATIENT)
Dept: PHYSICAL THERAPY | Age: 75
Setting detail: THERAPIES SERIES
Discharge: HOME | End: 2025-05-19
Payer: COMMERCIAL

## 2025-05-19 PROCEDURE — 97112 NEUROMUSCULAR REEDUCATION: CPT

## 2025-05-19 PROCEDURE — 97110 THERAPEUTIC EXERCISES: CPT

## 2025-05-19 PROCEDURE — 97140 MANUAL THERAPY 1/> REGIONS: CPT

## 2025-05-19 NOTE — FLOWSHEET NOTE
pain. Reports that she felt a little bit better each day after last visit. Only having annoying pain over R PSIS this morning.     From Eval 4/17/25: Patient reports taht over the past 2 years, she has had 2 back surgeries micro lumbar laminectomy L4-L5, micro lumbar laminectomy and diskectomy last December. After coming off of restrictions 1 week, was at work and went to sit back in chair, but it had rolled away. So she fell onto the ground, landing on her tailbone on March 27th, 2025. Since fall, she is having a lot of tailbone pain. \"I know every pothole between here and home\". Has to sit for work, but able to stand some since switching to night shift. Uses donuts to sit on. Least pain with sitting fwd to take pressure off of tailbone. Reports butt has pins/needles sensation. Reports that she can only sit for about 1hr. Increased pain with sitting, laying on back, walking >1/2 mile, driving, and difficulty with using the bathroom.        Test used Initial score  4/17/25 05/19/2025   Pain Summary VAS 11/10  2-3/10    Functional questionnaire Modified Oswestry 24/50: 48% disability    Other:              OBJECTIVE EXAMINATION   See eval   4/17/25     Exercises/Interventions     Therapeutic Ex (06282)  resistance Sets/time Reps Notes/Cues/Progressions   Nustep 5 5 min --    Supine pelvic tilt     Standing against wall pelvic tilt     Open book     Trunk flexion at table     LTR     SB:    - LTR    - DKTC    1  1   10  10    Prone press up      Child's pose     Quadriped bwd rocking     Prone hip extension  1 10 B    Prone bent knee hip ext  1  10 B    Supine march  Green TB 1 10 B                  Manual Intervention (86621)  TIME   15 min     PA grade I-III sacrum       TPR to B paraspinals and gluts       Ball belt traction          Checked SI alignment - no rotation noted 5/19/25          NMR re-education (81238) resistance Sets/time Reps CUES NEEDED   Supine march +TA 4#    Supine march  Green TB 1 10 B

## 2025-05-22 ENCOUNTER — APPOINTMENT (OUTPATIENT)
Dept: PHYSICAL THERAPY | Age: 75
End: 2025-05-22
Payer: COMMERCIAL

## 2025-05-29 ENCOUNTER — HOSPITAL ENCOUNTER (OUTPATIENT)
Dept: PHYSICAL THERAPY | Age: 75
Setting detail: THERAPIES SERIES
Discharge: HOME | End: 2025-05-29
Payer: COMMERCIAL

## 2025-05-29 PROCEDURE — 97530 THERAPEUTIC ACTIVITIES: CPT

## 2025-05-29 NOTE — PLAN OF CARE
[facility] - Outpatient Rehabilitation and Therapy: [address] [place] [address] office: [phone] fax: [phone]      Physical Therapy: TREATMENT/PROGRESS NOTE   Patient: Fabiana Mckeon (74 y.o. female)   Examination Date: 2025   :  1950 MRN: [id number]   Visit #:   C-9 12 visits until 25  Insurance Allowable Auth Needed   C-9 []Yes    []No    Insurance: Payor: [facility] / Plan: CCMSI / Product Type: *No Product type* /   Insurance ID: [id number] - (Worker's Comp)  Secondary Insurance (if applicable):    Treatment Diagnosis:     ICD-10-CM    1. Low back pain, unspecified back pain laterality, unspecified chronicity, unspecified whether sciatica present  M54.50       2. Coccyx pain  M53.3       3. Weakness of lower extremity, unspecified laterality  R29.898          Medical Diagnosis:  Lumbar strain [S39.012A]  Coccyx contusion [S30.0XXA]   Referring Physician: Víctor Hough MD  PCP: Sharonda Muniz MD     Plan of care signed (Y/N): Y    Date of Patient follow up with Physician: PRN     Plan of Care Report: NO  POC update due: (10 visits /OR AUTH LIMITS, whichever is less)  2025                                             Medical History:  Comorbidities:  Diabetes (Type I or II)  Hypertension  Prior Surgeries: microlumbar laminectomy L4-5 22, microlumbar laminectomy disectomy R L4-L5 24  Relevant Medical History: see chart                                         Precautions/ Contra-indications:           Latex allergy:  NO  Pacemaker:    NO  Contraindications for Manipulation: NA      Red Flags:  None    Suicide Screening:   The patient did not verbalize a primary behavioral concern, suicidal ideation, suicidal intent, or demonstrate suicidal behaviors.    Preferred Language for Healthcare:   [x] English       [] other:    SUBJECTIVE EXAMINATION   Patient stated complaint: Reports she has been pain free for at least a week. No

## (undated) DEVICE — SOLUTION IRRIG 1000ML 0.9% SOD CHL USP POUR PLAS BTL

## (undated) DEVICE — SUTURE VCRL + SZ 3-0 L27IN ABSRB UD L26MM SH 1/2 CIR VCP416H

## (undated) DEVICE — Device

## (undated) DEVICE — SOLUTION IV IRRIG POUR BRL 0.9% SODIUM CHL 2F7124

## (undated) DEVICE — KIT JACK TBL PT CARE

## (undated) DEVICE — NEPTUNE E-SEP SMOKE EVACUATION PENCIL, COATED, 70MM BLADE, PUSH BUTTON SWITCH: Brand: NEPTUNE E-SEP

## (undated) DEVICE — GLOVE ORANGE PI 7 1/2   MSG9075

## (undated) DEVICE — SUTURE MCRYL + SZ 4-0 L18IN ABSRB UD L19MM PS-2 3/8 CIR MCP496G

## (undated) DEVICE — SUTURE VCRL SZ 0 L27IN ABSRB UD L26MM CT-2 1/2 CIR J270H

## (undated) DEVICE — C-ARM: Brand: UNBRANDED